# Patient Record
Sex: FEMALE | Race: WHITE | HISPANIC OR LATINO | Employment: FULL TIME | URBAN - METROPOLITAN AREA
[De-identification: names, ages, dates, MRNs, and addresses within clinical notes are randomized per-mention and may not be internally consistent; named-entity substitution may affect disease eponyms.]

---

## 2017-02-09 ENCOUNTER — HOSPITAL ENCOUNTER (OUTPATIENT)
Dept: LAB | Facility: MEDICAL CENTER | Age: 32
End: 2017-02-09
Attending: INTERNAL MEDICINE
Payer: COMMERCIAL

## 2017-02-09 LAB
25(OH)D3 SERPL-MCNC: 14 NG/ML (ref 30–100)
ALBUMIN SERPL BCP-MCNC: 4 G/DL (ref 3.2–4.9)
ALBUMIN/GLOB SERPL: 1.3 G/DL
ALP SERPL-CCNC: 69 U/L (ref 30–99)
ALT SERPL-CCNC: 15 U/L (ref 2–50)
ANION GAP SERPL CALC-SCNC: 8 MMOL/L (ref 0–11.9)
APPEARANCE UR: CLEAR
AST SERPL-CCNC: 17 U/L (ref 12–45)
BASOPHILS # BLD AUTO: 0.03 K/UL (ref 0–0.12)
BASOPHILS NFR BLD AUTO: 0.5 % (ref 0–1.8)
BILIRUB SERPL-MCNC: 0.5 MG/DL (ref 0.1–1.5)
BILIRUB UR QL STRIP.AUTO: NEGATIVE
BUN SERPL-MCNC: 9 MG/DL (ref 8–22)
CALCIUM SERPL-MCNC: 9.5 MG/DL (ref 8.5–10.5)
CHLORIDE SERPL-SCNC: 104 MMOL/L (ref 96–112)
CHOLEST SERPL-MCNC: 224 MG/DL (ref 100–199)
CO2 SERPL-SCNC: 24 MMOL/L (ref 20–33)
COLOR UR AUTO: YELLOW
CREAT SERPL-MCNC: 0.65 MG/DL (ref 0.5–1.4)
EOSINOPHIL # BLD: 0.05 K/UL (ref 0–0.51)
EOSINOPHIL NFR BLD AUTO: 0.8 % (ref 0–6.9)
ERYTHROCYTE [DISTWIDTH] IN BLOOD BY AUTOMATED COUNT: 42.1 FL (ref 35.9–50)
EST. AVERAGE GLUCOSE BLD GHB EST-MCNC: 105 MG/DL
GLOBULIN SER CALC-MCNC: 3.2 G/DL (ref 1.9–3.5)
GLUCOSE SERPL-MCNC: 106 MG/DL (ref 65–99)
GLUCOSE UR STRIP.AUTO-MCNC: NEGATIVE MG/DL
HBA1C MFR BLD: 5.3 % (ref 0–5.6)
HCT VFR BLD AUTO: 41 % (ref 37–47)
HDLC SERPL-MCNC: 50 MG/DL
HGB BLD-MCNC: 13.4 G/DL (ref 12–16)
IMM GRANULOCYTES # BLD AUTO: 0.03 K/UL (ref 0–0.11)
IMM GRANULOCYTES NFR BLD AUTO: 0.5 % (ref 0–0.9)
KETONES UR STRIP.AUTO-MCNC: NEGATIVE MG/DL
LDLC SERPL CALC-MCNC: 134 MG/DL
LEUKOCYTE ESTERASE UR QL STRIP.AUTO: NEGATIVE
LYMPHOCYTES # BLD: 1.71 K/UL (ref 1–4.8)
LYMPHOCYTES NFR BLD AUTO: 27.2 % (ref 22–41)
MCH RBC QN AUTO: 29.6 PG (ref 27–33)
MCHC RBC AUTO-ENTMCNC: 32.7 G/DL (ref 33.6–35)
MCV RBC AUTO: 90.7 FL (ref 81.4–97.8)
MICRO URNS: NORMAL
MONOCYTES # BLD: 0.53 K/UL (ref 0–0.85)
MONOCYTES NFR BLD AUTO: 8.4 % (ref 0–13.4)
NEUTROPHILS # BLD: 3.94 K/UL (ref 2–7.15)
NEUTROPHILS NFR BLD AUTO: 62.6 % (ref 44–72)
NITRITE UR QL STRIP.AUTO: NEGATIVE
NRBC # BLD AUTO: 0 K/UL
NRBC BLD-RTO: 0 /100 WBC
PH UR: 7 [PH]
PLATELET # BLD AUTO: 201 K/UL (ref 164–446)
PMV BLD AUTO: 13.5 FL (ref 9–12.9)
POTASSIUM SERPL-SCNC: 3.9 MMOL/L (ref 3.6–5.5)
PROT SERPL-MCNC: 7.2 G/DL (ref 6–8.2)
PROT UR QL STRIP: NEGATIVE MG/DL
RBC # BLD AUTO: 4.52 M/UL (ref 4.2–5.4)
RBC UR QL AUTO: NEGATIVE
SODIUM SERPL-SCNC: 136 MMOL/L (ref 135–145)
SP GR UR STRIP.AUTO: 1.02
TRIGL SERPL-MCNC: 202 MG/DL (ref 0–149)
WBC # BLD AUTO: 6.3 K/UL (ref 4.8–10.8)

## 2017-02-09 PROCEDURE — 85025 COMPLETE CBC W/AUTO DIFF WBC: CPT

## 2017-02-09 PROCEDURE — 81003 URINALYSIS AUTO W/O SCOPE: CPT

## 2017-02-09 PROCEDURE — 36415 COLL VENOUS BLD VENIPUNCTURE: CPT

## 2017-02-09 PROCEDURE — 80053 COMPREHEN METABOLIC PANEL: CPT

## 2017-02-09 PROCEDURE — 82306 VITAMIN D 25 HYDROXY: CPT

## 2017-02-09 PROCEDURE — 83036 HEMOGLOBIN GLYCOSYLATED A1C: CPT

## 2017-02-09 PROCEDURE — 80061 LIPID PANEL: CPT

## 2017-02-13 ENCOUNTER — HOSPITAL ENCOUNTER (OUTPATIENT)
Dept: LAB | Facility: MEDICAL CENTER | Age: 32
End: 2017-02-13
Attending: OBSTETRICS & GYNECOLOGY
Payer: COMMERCIAL

## 2017-02-13 LAB — B-HCG SERPL-ACNC: <0.6 MIU/ML (ref 0–5)

## 2017-02-13 PROCEDURE — 36415 COLL VENOUS BLD VENIPUNCTURE: CPT

## 2017-02-13 PROCEDURE — 84702 CHORIONIC GONADOTROPIN TEST: CPT

## 2017-03-06 ENCOUNTER — NON-PROVIDER VISIT (OUTPATIENT)
Dept: OCCUPATIONAL MEDICINE | Facility: CLINIC | Age: 32
End: 2017-03-06

## 2017-03-06 DIAGNOSIS — Z02.89 ENCOUNTER FOR OCCUPATIONAL HEALTH ASSESSMENT: ICD-10-CM

## 2017-03-06 PROCEDURE — 90746 HEPB VACCINE 3 DOSE ADULT IM: CPT | Performed by: PREVENTIVE MEDICINE

## 2017-03-06 PROCEDURE — 86580 TB INTRADERMAL TEST: CPT | Performed by: PREVENTIVE MEDICINE

## 2017-03-08 ENCOUNTER — NON-PROVIDER VISIT (OUTPATIENT)
Dept: OCCUPATIONAL MEDICINE | Facility: CLINIC | Age: 32
End: 2017-03-08

## 2017-03-08 DIAGNOSIS — Z11.1 ENCOUNTER FOR PPD SKIN TEST READING: ICD-10-CM

## 2017-03-08 LAB — TB WHEAL 3D P 5 TU DIAM: NORMAL MM

## 2017-03-13 ENCOUNTER — NON-PROVIDER VISIT (OUTPATIENT)
Dept: OCCUPATIONAL MEDICINE | Facility: CLINIC | Age: 32
End: 2017-03-13

## 2017-03-13 DIAGNOSIS — Z11.1 ENCOUNTER FOR PPD TEST: ICD-10-CM

## 2017-03-13 PROCEDURE — 86580 TB INTRADERMAL TEST: CPT | Performed by: PREVENTIVE MEDICINE

## 2017-03-15 ENCOUNTER — NON-PROVIDER VISIT (OUTPATIENT)
Dept: OCCUPATIONAL MEDICINE | Facility: CLINIC | Age: 32
End: 2017-03-15

## 2017-03-15 DIAGNOSIS — Z11.1 ENCOUNTER FOR PPD SKIN TEST READING: ICD-10-CM

## 2017-03-15 LAB — TB WHEAL 3D P 5 TU DIAM: NORMAL MM

## 2017-04-07 ENCOUNTER — EH NON-PROVIDER (OUTPATIENT)
Dept: OCCUPATIONAL MEDICINE | Facility: CLINIC | Age: 32
End: 2017-04-07

## 2017-04-07 DIAGNOSIS — Z23 NEED FOR HEPATITIS B VACCINATION: ICD-10-CM

## 2017-04-07 PROCEDURE — 90746 HEPB VACCINE 3 DOSE ADULT IM: CPT | Performed by: PREVENTIVE MEDICINE

## 2017-09-15 ENCOUNTER — EH NON-PROVIDER (OUTPATIENT)
Dept: OCCUPATIONAL MEDICINE | Facility: CLINIC | Age: 32
End: 2017-09-15

## 2017-09-15 DIAGNOSIS — Z23 NEED FOR VACCINATION: Primary | ICD-10-CM

## 2017-09-15 PROCEDURE — 90746 HEPB VACCINE 3 DOSE ADULT IM: CPT | Performed by: PREVENTIVE MEDICINE

## 2018-01-31 ENCOUNTER — HOSPITAL ENCOUNTER (OUTPATIENT)
Facility: MEDICAL CENTER | Age: 33
End: 2018-01-31
Attending: OBSTETRICS & GYNECOLOGY
Payer: COMMERCIAL

## 2018-01-31 LAB
25(OH)D3 SERPL-MCNC: 16 NG/ML (ref 30–100)
ALBUMIN SERPL BCP-MCNC: 4.8 G/DL (ref 3.2–4.9)
ALBUMIN/GLOB SERPL: 1.9 G/DL
ALP SERPL-CCNC: 64 U/L (ref 30–99)
ALT SERPL-CCNC: 11 U/L (ref 2–50)
ANION GAP SERPL CALC-SCNC: 6 MMOL/L (ref 0–11.9)
AST SERPL-CCNC: 18 U/L (ref 12–45)
BASOPHILS # BLD AUTO: 0.4 % (ref 0–1.8)
BASOPHILS # BLD: 0.03 K/UL (ref 0–0.12)
BILIRUB SERPL-MCNC: 1.1 MG/DL (ref 0.1–1.5)
BUN SERPL-MCNC: 10 MG/DL (ref 8–22)
CALCIUM SERPL-MCNC: 9.1 MG/DL (ref 8.5–10.5)
CHLORIDE SERPL-SCNC: 105 MMOL/L (ref 96–112)
CHOLEST SERPL-MCNC: 217 MG/DL (ref 100–199)
CO2 SERPL-SCNC: 26 MMOL/L (ref 20–33)
CREAT SERPL-MCNC: 0.62 MG/DL (ref 0.5–1.4)
EOSINOPHIL # BLD AUTO: 0.04 K/UL (ref 0–0.51)
EOSINOPHIL NFR BLD: 0.5 % (ref 0–6.9)
ERYTHROCYTE [DISTWIDTH] IN BLOOD BY AUTOMATED COUNT: 42.8 FL (ref 35.9–50)
GLOBULIN SER CALC-MCNC: 2.5 G/DL (ref 1.9–3.5)
GLUCOSE SERPL-MCNC: 99 MG/DL (ref 65–99)
HCT VFR BLD AUTO: 40.3 % (ref 37–47)
HDLC SERPL-MCNC: 50 MG/DL
HGB BLD-MCNC: 13.9 G/DL (ref 12–16)
HIV 1+2 AB+HIV1 P24 AG SERPL QL IA: NON REACTIVE
IMM GRANULOCYTES # BLD AUTO: 0.04 K/UL (ref 0–0.11)
IMM GRANULOCYTES NFR BLD AUTO: 0.5 % (ref 0–0.9)
LDLC SERPL CALC-MCNC: 144 MG/DL
LYMPHOCYTES # BLD AUTO: 1.84 K/UL (ref 1–4.8)
LYMPHOCYTES NFR BLD: 24.2 % (ref 22–41)
MCH RBC QN AUTO: 31.7 PG (ref 27–33)
MCHC RBC AUTO-ENTMCNC: 34.5 G/DL (ref 33.6–35)
MCV RBC AUTO: 92 FL (ref 81.4–97.8)
MONOCYTES # BLD AUTO: 0.4 K/UL (ref 0–0.85)
MONOCYTES NFR BLD AUTO: 5.3 % (ref 0–13.4)
NEUTROPHILS # BLD AUTO: 5.24 K/UL (ref 2–7.15)
NEUTROPHILS NFR BLD: 69.1 % (ref 44–72)
NRBC # BLD AUTO: 0 K/UL
NRBC BLD-RTO: 0 /100 WBC
PLATELET # BLD AUTO: 230 K/UL (ref 164–446)
PMV BLD AUTO: 13.2 FL (ref 9–12.9)
POTASSIUM SERPL-SCNC: 4 MMOL/L (ref 3.6–5.5)
PROT SERPL-MCNC: 7.3 G/DL (ref 6–8.2)
RBC # BLD AUTO: 4.38 M/UL (ref 4.2–5.4)
SODIUM SERPL-SCNC: 137 MMOL/L (ref 135–145)
TRIGL SERPL-MCNC: 116 MG/DL (ref 0–149)
TSH SERPL DL<=0.005 MIU/L-ACNC: 2.9 UIU/ML (ref 0.38–5.33)
WBC # BLD AUTO: 7.6 K/UL (ref 4.8–10.8)

## 2018-01-31 PROCEDURE — 82306 VITAMIN D 25 HYDROXY: CPT

## 2018-01-31 PROCEDURE — 85025 COMPLETE CBC W/AUTO DIFF WBC: CPT

## 2018-01-31 PROCEDURE — 84443 ASSAY THYROID STIM HORMONE: CPT

## 2018-01-31 PROCEDURE — 80061 LIPID PANEL: CPT

## 2018-01-31 PROCEDURE — 87389 HIV-1 AG W/HIV-1&-2 AB AG IA: CPT

## 2018-01-31 PROCEDURE — 80053 COMPREHEN METABOLIC PANEL: CPT

## 2018-05-25 ENCOUNTER — OFFICE VISIT (OUTPATIENT)
Dept: MEDICAL GROUP | Facility: PHYSICIAN GROUP | Age: 33
End: 2018-05-25
Payer: COMMERCIAL

## 2018-05-25 VITALS
SYSTOLIC BLOOD PRESSURE: 106 MMHG | HEIGHT: 61 IN | OXYGEN SATURATION: 96 % | RESPIRATION RATE: 16 BRPM | BODY MASS INDEX: 29.45 KG/M2 | TEMPERATURE: 69.8 F | DIASTOLIC BLOOD PRESSURE: 60 MMHG | HEART RATE: 74 BPM | WEIGHT: 156 LBS

## 2018-05-25 DIAGNOSIS — R53.82 CHRONIC FATIGUE: ICD-10-CM

## 2018-05-25 DIAGNOSIS — G43.109 MIGRAINE WITH AURA AND WITHOUT STATUS MIGRAINOSUS, NOT INTRACTABLE: ICD-10-CM

## 2018-05-25 DIAGNOSIS — M19.032 PRIMARY OSTEOARTHRITIS OF BOTH WRISTS: ICD-10-CM

## 2018-05-25 DIAGNOSIS — M19.031 PRIMARY OSTEOARTHRITIS OF BOTH WRISTS: ICD-10-CM

## 2018-05-25 DIAGNOSIS — E78.2 MIXED HYPERLIPIDEMIA: ICD-10-CM

## 2018-05-25 DIAGNOSIS — E55.9 VITAMIN D DEFICIENCY: ICD-10-CM

## 2018-05-25 PROCEDURE — 99214 OFFICE O/P EST MOD 30 MIN: CPT | Performed by: NURSE PRACTITIONER

## 2018-05-25 RX ORDER — ERGOCALCIFEROL 1.25 MG/1
50000 CAPSULE ORAL
COMMUNITY
Start: 2018-03-12 | End: 2018-06-19

## 2018-05-25 RX ORDER — SUMATRIPTAN 25 MG/1
25-100 TABLET, FILM COATED ORAL
Qty: 10 TAB | Refills: 3 | Status: SHIPPED | OUTPATIENT
Start: 2018-05-25 | End: 2018-11-22

## 2018-05-25 ASSESSMENT — PAIN SCALES - GENERAL: PAINLEVEL: NO PAIN

## 2018-05-25 ASSESSMENT — PATIENT HEALTH QUESTIONNAIRE - PHQ9: CLINICAL INTERPRETATION OF PHQ2 SCORE: 0

## 2018-05-25 NOTE — PROGRESS NOTES
"Chief Complaint   Patient presents with   • Establish Care     New Patient    • Labs Only   • Medication Refill       HISTORY OF THE PRESENT ILLNESS: This is a 32 y.o. female new patient to our practice. This pleasant patient is here today establish and discuss migraines.    Chronic fatigue  Chronic in nature. States she drinks a lot of coffee. Does have low vitamin D. States that she struggles with sleep related to back pain. States she tosses and turns, states she wakes up because she worries about her elder dogs passing. Patient does have some anxiety, some possible seasonal affective disorder patient reports that she suffered to participate in her regular activities but states this gets better over the summer. Patient is also having some \"gallbladder issues\".    Migraine with aura and without status migrainosus, not intractable  Chronic in nature. Started 5-6 years ago. States throbbing behind her eye, states light sensitivity. Severe blurry vision and nausea. States they happen approximately 1/month. States no change in HA pattern. States she will sleep in a dark. States it will extend into her neck, states that she uses home remedies. Stress triggers her migraines.    Mixed hyperlipidemia  Chronic in nature. Stable. Most recent labs in January.    Primary osteoarthritis of both wrists  Chronic in nature. Stable. Managed with Naprosyn.      Past Medical History:   Diagnosis Date   • Hyperlipidemia    • Osteoarthritis    • Vitamin D deficiency disease        History reviewed. No pertinent surgical history.    Family Status   Relation Status   • Mother    • Sister      Family History   Problem Relation Age of Onset   • Psychiatry Mother      depression   • Psychiatry Sister      depression       Social History   Substance Use Topics   • Smoking status: Never Smoker   • Smokeless tobacco: Never Used   • Alcohol use Yes      Comment: once a month        Allergies: Latex    Current Outpatient Prescriptions Ordered " "in Epic   Medication Sig Dispense Refill   • SUMAtriptan (IMITREX) 25 MG Tab tablet Take 1-4 Tabs by mouth Once PRN for Migraine for up to 1 dose. 10 Tab 3   • vitamin D, Ergocalciferol, (DRISDOL) 18030 units Cap capsule Take 50,000 Units by mouth every 7 days.       No current [facility]-ordered facility-administered medications on file.        Review of Systems   Constitutional:  Negative for fever, chills, weight loss and malaise/fatigue.   HENT:  Negative for ear pain, nosebleeds, congestion, sore throat and neck pain.    Eyes:  Negative for blurred vision.   Respiratory:  Negative for cough, sputum production, shortness of breath and wheezing.    Cardiovascular:  Negative for chest pain, palpitations, orthopnea and leg swelling.   Gastrointestinal:  Negative for heartburn, nausea, vomiting and abdominal pain.   Genitourinary:  Negative for dysuria, urgency and frequency.   Musculoskeletal:  Negative for myalgias, back pain and joint pain.   Skin:  Negative for rash and itching.   Neurological:  Negative for dizziness, tingling, tremors, sensory change, focal weakness and headaches.   Endo/Heme/Allergies:  Does not bruise/bleed easily.   Psychiatric/Behavioral:  Negative for depression, positive anxiety, or negative memory loss.     All other systems reviewed and are negative except as in HPI.    Exam: Blood pressure 106/60, pulse 74, temperature (!) 21 °C (69.8 °F), resp. rate 16, height 1.549 m (5' 1\"), weight 70.8 kg (156 lb), last menstrual period 05/09/2018, SpO2 96 %, not currently breastfeeding.  General:  Normal appearing. No distress.  Pulmonary:  Clear to ausculation.  Normal effort. No rales, ronchi, or wheezing.  Cardiovascular:  Regular rate and rhythm without murmur. Carotid and radial pulses are intact and equal bilaterally.  Neurologic:  Grossly nonfocal  Skin:  Warm and dry.  No obvious lesions.  Musculoskeletal:  Normal gait. No extremity cyanosis, clubbing, or edema.  Psych:  Normal mood and affect. " Alert and oriented x3. Judgment and insight is normal.    PLAN:    1. Primary osteoarthritis of both wrists  Continue current plan of care    2. Mixed hyperlipidemia  Continue current management, update labs    3. Chronic fatigue  Plan to check vitamin D and B12 concern for seasonal affective disorder versus depression versus anxiety versus vitamin deficiency  - VITAMIN D,25 HYDROXY; Future  - VITAMIN B12; Future    4. Migraine with aura and without status migrainosus, not intractable  Continue current plan of care counseled regarding risks, benefits, side effects of medication.  - SUMAtriptan (IMITREX) 25 MG Tab tablet; Take 1-4 Tabs by mouth Once PRN for Migraine for up to 1 dose.  Dispense: 10 Tab; Refill: 3    5. Vitamin D deficiency  Repeat lab, continue supplementation.  - VITAMIN D,25 HYDROXY; Future    Follow-up in one month LONG. Patient is encouraged to be seen in the emergency room for chest pain, palpitations, shortness of breath, dizziness, severe abdominal pain or other concerning symptoms.    Please note that this dictation was created using voice recognition software. I have made every reasonable attempt to correct obvious errors, but I expect that there are errors of grammar and possibly content that I did not discover before finalizing the note.      Assessment/Plan  1. Primary osteoarthritis of both wrists     2. Mixed hyperlipidemia     3. Chronic fatigue  VITAMIN D,25 HYDROXY    VITAMIN B12   4. Migraine with aura and without status migrainosus, not intractable  SUMAtriptan (IMITREX) 25 MG Tab tablet   5. Vitamin D deficiency  VITAMIN D,25 HYDROXY         I have placed the below orders and discussed them with an approved delegating provider. The MA is performing the below orders under the direction of Dr. Be.

## 2018-05-25 NOTE — LETTER
[facility]  NICHOLAS TamayoP.RANGEL  [address]  [place] [address]  Fax: [phone]   Authorization for Release/Disclosure of   Protected Health Information   Name: EZRA CHAVEZ : 1985 SSN: [id number]   Address: [address] [place] [address] [place] [address] Phone:    [phone] (home)    I authorize the entity listed below to release/disclose the PHI below to:   [facility]/MADELINE Tamayo.P.R.CARMEL and NICHOLAS TamayoP.RANGEL   Provider or Entity Name:  [facility]    Address   City, [facility]  Phone:      Fax:     Reason for request: continuity of care   Information to be released:    [  ] LAST COLONOSCOPY,  including any PATH REPORT and follow-up  [  ] LAST FIT/COLOGUARD RESULT [  ] LAST DEXA  [  ] LAST MAMMOGRAM  [  ] LAST PAP  [  ] LAST LABS [  ] RETINA EXAM REPORT  [  ] IMMUNIZATION RECORDS  [ x ] Release all info      [  ] Check here and initial the line next to each item to release ALL health information INCLUDING  _____ Care and treatment for drug and / or alcohol abuse  _____ HIV testing, infection status, or AIDS  _____ Genetic Testing    DATES OF SERVICE OR TIME PERIOD TO BE DISCLOSED: _____________  I understand and acknowledge that:  * This Authorization may be revoked at any time by you in writing, except if your health information has already been used or disclosed.  * Your health information that will be used or disclosed as a result of you signing this authorization could be re-disclosed by the recipient. If this occurs, your re-disclosed health information may no longer be protected by State or Federal laws.  * You may refuse to sign this Authorization. Your refusal will not affect your ability to obtain treatment.  * This Authorization becomes effective upon signing and will  on (date) __________.      If no date is indicated, this Authorization will  one (1) year from the signature date.    Name:  Marcial Gao    Signature:   Date:     5/25/2018       PLEASE FAX REQUESTED RECORDS BACK TO: [phone]

## 2018-05-25 NOTE — ASSESSMENT & PLAN NOTE
"Chronic in nature. States she drinks a lot of coffee. Does have low vitamin D. States that she struggles with sleep related to back pain. States she tosses and turns, states she wakes up because she worries about her elder dogs passing. Patient does have some anxiety, some possible seasonal affective disorder patient reports that she suffered to participate in her regular activities but states this gets better over the summer. Patient is also having some \"gallbladder issues\".  "

## 2018-05-25 NOTE — LETTER
[facility]  NICHOLAS TamayoP.RANGEL  [address]  [place] [address]  Fax: [phone]   Authorization for Release/Disclosure of   Protected Health Information   Name: EZRA CHAVEZ : 1985 SSN: [id number]   Address: [address] [place] [address]   [place] [address] Phone:    [phone] (home)    I authorize the entity listed below to release/disclose the PHI below to:   [facility]/MADELINE Tamayo.P.R.CARMEL and MADELINE Tamayo.P.RANGEL   Provider or Entity Name:  CARLIN Vera    Address   City, State, Zip   Phone:      Fax:     Reason for request: continuity of care   Information to be released:    [  ] LAST COLONOSCOPY,  including any PATH REPORT and follow-up  [  ] LAST FIT/COLOGUARD RESULT [  ] LAST DEXA  [  ] LAST MAMMOGRAM  [ x ] LAST PAP  [  ] LAST LABS [  ] RETINA EXAM REPORT  [  ] IMMUNIZATION RECORDS  [  ] Release all info      [  ] Check here and initial the line next to each item to release ALL health information INCLUDING  _____ Care and treatment for drug and / or alcohol abuse  _____ HIV testing, infection status, or AIDS  _____ Genetic Testing    DATES OF SERVICE OR TIME PERIOD TO BE DISCLOSED: _____________  I understand and acknowledge that:  * This Authorization may be revoked at any time by you in writing, except if your health information has already been used or disclosed.  * Your health information that will be used or disclosed as a result of you signing this authorization could be re-disclosed by the recipient. If this occurs, your re-disclosed health information may no longer be protected by State or Federal laws.  * You may refuse to sign this Authorization. Your refusal will not affect your ability to obtain treatment.  * This Authorization becomes effective upon signing and will  on (date) __________.      If no date is indicated, this Authorization will  one (1) year from the signature date.    Name: Ezra  Tang Gao    Signature:   Date:     5/25/2018       PLEASE FAX REQUESTED RECORDS BACK TO: [phone]

## 2018-05-25 NOTE — ASSESSMENT & PLAN NOTE
Chronic in nature. Started 5-6 years ago. States throbbing behind her eye, states light sensitivity. Severe blurry vision and nausea. States they happen approximately 1/month. States no change in HA pattern. States she will sleep in a dark. States it will extend into her neck, states that she uses home remedies. Stress triggers her migraines.

## 2018-05-29 ENCOUNTER — HOSPITAL ENCOUNTER (OUTPATIENT)
Dept: RADIOLOGY | Facility: MEDICAL CENTER | Age: 33
End: 2018-05-29
Attending: SURGERY
Payer: COMMERCIAL

## 2018-05-29 DIAGNOSIS — R10.30 ABDOMINAL PAIN, LOWER: ICD-10-CM

## 2018-05-29 PROCEDURE — 76700 US EXAM ABDOM COMPLETE: CPT

## 2018-06-05 ENCOUNTER — APPOINTMENT (OUTPATIENT)
Dept: RADIOLOGY | Facility: MEDICAL CENTER | Age: 33
End: 2018-06-05
Attending: SURGERY
Payer: COMMERCIAL

## 2018-06-15 ENCOUNTER — HOSPITAL ENCOUNTER (OUTPATIENT)
Dept: LAB | Facility: MEDICAL CENTER | Age: 33
End: 2018-06-15
Attending: NURSE PRACTITIONER
Payer: COMMERCIAL

## 2018-06-15 ENCOUNTER — HOSPITAL ENCOUNTER (OUTPATIENT)
Dept: RADIOLOGY | Facility: MEDICAL CENTER | Age: 33
End: 2018-06-15
Attending: SURGERY
Payer: COMMERCIAL

## 2018-06-15 DIAGNOSIS — K82.8 BILIARY DYSKINESIA: ICD-10-CM

## 2018-06-15 DIAGNOSIS — E55.9 VITAMIN D DEFICIENCY: ICD-10-CM

## 2018-06-15 DIAGNOSIS — R53.82 CHRONIC FATIGUE: ICD-10-CM

## 2018-06-15 DIAGNOSIS — E78.2 MIXED HYPERLIPIDEMIA: ICD-10-CM

## 2018-06-15 LAB
25(OH)D3 SERPL-MCNC: 41 NG/ML (ref 30–100)
CHOLEST SERPL-MCNC: 243 MG/DL (ref 100–199)
HDLC SERPL-MCNC: 52 MG/DL
LDLC SERPL CALC-MCNC: 172 MG/DL
TRIGL SERPL-MCNC: 94 MG/DL (ref 0–149)
VIT B12 SERPL-MCNC: 622 PG/ML (ref 211–911)

## 2018-06-15 PROCEDURE — 82306 VITAMIN D 25 HYDROXY: CPT

## 2018-06-15 PROCEDURE — 700111 HCHG RX REV CODE 636 W/ 250 OVERRIDE (IP)

## 2018-06-15 PROCEDURE — 82607 VITAMIN B-12: CPT

## 2018-06-15 PROCEDURE — 36415 COLL VENOUS BLD VENIPUNCTURE: CPT

## 2018-06-15 PROCEDURE — 80061 LIPID PANEL: CPT

## 2018-06-15 PROCEDURE — 78227 HEPATOBIL SYST IMAGE W/DRUG: CPT

## 2018-06-15 RX ORDER — SINCALIDE 5 UG/5ML
INJECTION, POWDER, LYOPHILIZED, FOR SOLUTION INTRAVENOUS
Status: COMPLETED
Start: 2018-06-15 | End: 2018-06-15

## 2018-06-15 RX ADMIN — SINCALIDE 1.4 MCG: 5 INJECTION, POWDER, LYOPHILIZED, FOR SOLUTION INTRAVENOUS at 08:45

## 2018-06-18 ENCOUNTER — PATIENT MESSAGE (OUTPATIENT)
Dept: MEDICAL GROUP | Facility: PHYSICIAN GROUP | Age: 33
End: 2018-06-18

## 2018-06-18 DIAGNOSIS — E55.9 VITAMIN D DEFICIENCY: ICD-10-CM

## 2018-06-19 RX ORDER — ERGOCALCIFEROL 1.25 MG/1
50000 CAPSULE ORAL
Qty: 12 CAP | Refills: 3 | Status: SHIPPED | OUTPATIENT
Start: 2018-06-19 | End: 2018-11-22

## 2018-06-22 ENCOUNTER — APPOINTMENT (OUTPATIENT)
Dept: MEDICAL GROUP | Facility: PHYSICIAN GROUP | Age: 33
End: 2018-06-22
Payer: COMMERCIAL

## 2018-09-12 ENCOUNTER — OFFICE VISIT (OUTPATIENT)
Dept: MEDICAL GROUP | Facility: PHYSICIAN GROUP | Age: 33
End: 2018-09-12
Payer: COMMERCIAL

## 2018-09-12 VITALS
HEART RATE: 100 BPM | BODY MASS INDEX: 28.32 KG/M2 | HEIGHT: 61 IN | TEMPERATURE: 98.6 F | DIASTOLIC BLOOD PRESSURE: 80 MMHG | WEIGHT: 150 LBS | SYSTOLIC BLOOD PRESSURE: 118 MMHG | RESPIRATION RATE: 16 BRPM | OXYGEN SATURATION: 96 %

## 2018-09-12 DIAGNOSIS — J32.9 SINUSITIS, UNSPECIFIED CHRONICITY, UNSPECIFIED LOCATION: ICD-10-CM

## 2018-09-12 DIAGNOSIS — R05.9 COUGH: ICD-10-CM

## 2018-09-12 PROCEDURE — 99214 OFFICE O/P EST MOD 30 MIN: CPT | Performed by: NURSE PRACTITIONER

## 2018-09-12 RX ORDER — PROMETHAZINE HYDROCHLORIDE 6.25 MG/5ML
6.25 SYRUP ORAL 4 TIMES DAILY PRN
Qty: 120 ML | Refills: 0 | Status: SHIPPED | OUTPATIENT
Start: 2018-09-12 | End: 2018-11-22

## 2018-09-12 RX ORDER — PROMETHAZINE HYDROCHLORIDE 6.25 MG/5ML
6.25 SYRUP ORAL 4 TIMES DAILY PRN
Qty: 120 ML | Refills: 0 | Status: SHIPPED | OUTPATIENT
Start: 2018-09-12 | End: 2018-09-12 | Stop reason: SDUPTHER

## 2018-09-12 RX ORDER — BENZONATATE 100 MG/1
100 CAPSULE ORAL 3 TIMES DAILY PRN
Qty: 60 CAP | Refills: 0 | Status: SHIPPED | OUTPATIENT
Start: 2018-09-12 | End: 2018-09-12 | Stop reason: SDUPTHER

## 2018-09-12 RX ORDER — BENZONATATE 100 MG/1
100 CAPSULE ORAL 3 TIMES DAILY PRN
Qty: 60 CAP | Refills: 0 | Status: SHIPPED | OUTPATIENT
Start: 2018-09-12 | End: 2018-11-22

## 2018-09-12 NOTE — LETTER
September 12, 2018        To whom it may concern,      Nellynaomie Tang Gao was seen in my office and is under my care.  Please excuse her from work through Sept 17.  She may return to work on the 18th.          Thank you,          Gurjit Valdez, APRN-BC

## 2018-09-12 NOTE — PROGRESS NOTES
Chief Complaint   Patient presents with   • Cough       HISTORY OF PRESENT ILLNESS: Patient is a 33 y.o. female established patient of [facility] who presents today to discuss the following issues:    Sinusitis  Started about 1 week ago with a sore throat.  Has progressed to sinus pressure and congestion, green drainage, and cough.  Used OTC meds and rested.  Cough is still bad at night.  Started a zpak yesterday.  Discussed plan.  She would like to proceed with trials of tessalon perles and phenergan cough syrup without codeine.      Patient Active Problem List    Diagnosis Date Noted   • Sinusitis 09/12/2018   • Primary osteoarthritis of both wrists 05/25/2018   • Mixed hyperlipidemia 05/25/2018   • Chronic fatigue 05/25/2018   • Migraine with aura and without status migrainosus, not intractable 05/25/2018       Allergies:Latex    Current Outpatient Prescriptions   Medication Sig Dispense Refill   • benzonatate (TESSALON) 100 MG Cap Take 1 Cap by mouth 3 times a day as needed for Cough. 60 Cap 0   • promethazine (PHENERGAN) 6.25 MG/5ML Syrup Take 5 mL by mouth 4 times a day as needed for Nausea/Vomiting. 120 mL 0   • ergocalciferol (DRISDOL) 09087 UNIT capsule Take 1 Cap by mouth every 7 days. 12 Cap 3   • SUMAtriptan (IMITREX) 25 MG Tab tablet Take 1-4 Tabs by mouth Once PRN for Migraine for up to 1 dose. 10 Tab 3     No current facility-administered medications for this visit.        Social History   Substance Use Topics   • Smoking status: Never Smoker   • Smokeless tobacco: Never Used   • Alcohol use Yes      Comment: once a month        Family Status   Relation Status   • Mo (Not Specified)   • Sis (Not Specified)     Family History   Problem Relation Age of Onset   • Psychiatry Mother         depression   • Psychiatry Sister         depression       Review of Systems:   Constitutional: Negative for fever, chills, weight loss and malaise/fatigue.   HENT: Negative for ear pain, nosebleeds, sore throat and  "neck pain.  Positive for sinus pressure and congestion.  Eyes: Negative for blurred vision.   Respiratory: Negative for cough, sputum production, shortness of breath and wheezing.    Cardiovascular: Negative for chest pain, palpitations, orthopnea and leg swelling.   Gastrointestinal: Negative for heartburn, nausea, vomiting and abdominal pain.   Genitourinary: Negative for dysuria, urgency and frequency.   Musculoskeletal: Negative for myalgias, joint pain, and back pain.  Skin: Negative for rash and itching.   Neurological: Negative for dizziness, tingling, tremors, sensory change, focal weakness and headaches.   Endo/Heme/Allergies: Does not bruise/bleed easily.   Psychiatric/Behavioral: Negative for depression, suicidal ideas and memory loss.  The patient is not nervous/anxious and does not have insomnia.    All other systems reviewed and are negative except as in HPI.    Exam:  Blood pressure 118/80, pulse 100, temperature 37 °C (98.6 °F), resp. rate 16, height 1.549 m (5' 1\"), weight 68 kg (150 lb), SpO2 96 %.  General:  Well nourished, well developed female in NAD  Head: Grossly normal. Turbs red and swollen.  Neck: Supple without JVD or bruit. Thyroid is not enlarged.  Pulmonary: Clear to ausculation. Normal effort. No rales, ronchi, or wheezing.  Cardiovascular: Regular rate and rhythm without murmur.   Abdomen:  Soft, nontender, nondistended.  Extremities: No clubbing, cyanosis, or edema.  Skin: Intact with no obvious rashes or lesions.  Neuro: Grossly intact.  Psych: Alert and oriented x 3.  Mood and affect appropriate.    Medical decision-making and discussion: Marcial is here today to discuss a sinus infection.  She will rest, finish her Zpak, and continue with OTC meds.  Prescriptions for tessalon perles and liquid phenergan were sent to her pharmacy.  She will follow-up here as needed.          Assessment/Plan:  1. Sinusitis, unspecified chronicity, unspecified location     2. Cough  benzonatate " (TESSALON) 100 MG Cap    promethazine (PHENERGAN) 6.25 MG/5ML Syrup    DISCONTINUED: benzonatate (TESSALON) 100 MG Cap    DISCONTINUED: promethazine (PHENERGAN) 6.25 MG/5ML Syrup       Return if symptoms worsen or fail to improve.    Please note that this dictation was created using voice recognition software. I have made every reasonable attempt to correct obvious errors, but I expect that there are errors of grammar and possibly content that I did not discover before finalizing the note.

## 2018-11-21 ENCOUNTER — OCCUPATIONAL MEDICINE (OUTPATIENT)
Dept: URGENT CARE | Facility: PHYSICIAN GROUP | Age: 33
End: 2018-11-21
Payer: COMMERCIAL

## 2018-11-21 VITALS
BODY MASS INDEX: 29.29 KG/M2 | HEART RATE: 89 BPM | TEMPERATURE: 97.9 F | OXYGEN SATURATION: 97 % | DIASTOLIC BLOOD PRESSURE: 82 MMHG | RESPIRATION RATE: 16 BRPM | WEIGHT: 155 LBS | SYSTOLIC BLOOD PRESSURE: 110 MMHG

## 2018-11-21 DIAGNOSIS — S39.012A STRAIN OF LUMBAR REGION, INITIAL ENCOUNTER: ICD-10-CM

## 2018-11-21 DIAGNOSIS — S46.811A TRAPEZIUS STRAIN, RIGHT, INITIAL ENCOUNTER: ICD-10-CM

## 2018-11-21 DIAGNOSIS — S39.012A LOW BACK STRAIN, INITIAL ENCOUNTER: ICD-10-CM

## 2018-11-21 PROCEDURE — 99214 OFFICE O/P EST MOD 30 MIN: CPT | Performed by: FAMILY MEDICINE

## 2018-11-21 RX ORDER — IBUPROFEN 200 MG
200 TABLET ORAL EVERY 6 HOURS PRN
COMMUNITY
End: 2018-11-26

## 2018-11-21 NOTE — LETTER
[address] [address]  Phone:  [phone] - Fax:  [phone]   [facility] Progress Report and Disability Certification  Date of Service: 11/21/2018   No Show:  No  Date / Time of Next Visit: 11/25/2018   Claim Information   Patient Name: Marcial Gao  Claim Number:     Employer: [employer]  Date of Injury: 11/20/2018     Insurer / TPA: [employer]  ID / SSN:     Occupation: Medical Assistant  Diagnosis: Diagnoses of Low back strain, initial encounter, Strain of lumbar region, initial encounter, and Trapezius strain, right, initial encounter were pertinent to this visit.    Medical Information   Related to Industrial Injury? Yes    Subjective Complaints:  Date of injury: 11/20/2018  Patient is here for evaluation of a back injury that happened yesterday while working.  She works with a surgeon.  Yesterday she she was trying to help a patient be transferred to stretcher and the weight of the patient fell and fell on her.  She did not have any immediate pain.  Later on in the evening she noticed increasing back stiffness.  Currently she is complaining of soreness on the right side of the shoulder posteriorly also and across the mid back and also in the lower back but denies any radiculopathy in the upper or lower extremity including muscle weakness, or loss of bladder or bowel control.  She denies any prior back problems.  She went to work today but then decided to come here for work related injury evaluation.  She reports that she took two 800 mg of ibuprofen this morning and two 800 mg of ibuprofen this afterno  on.  She has a weak stomach.  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   Objective Findings: Physical Exam   Constitutional: She is oriented to person, place, and time. She appears well-developed and well-nourished. No  distress.   HENT:   Head: Normocephalic and atraumatic.   Neck: Normal range of motion. Muscular tenderness present. No spinous process tenderness present.   Cardiovascular: Normal rate.    Pulmonary/Chest: Effort normal. No respiratory distress.   Musculoskeletal:        Right shoulder: Normal.        Left shoulder: Normal.        Right elbow: Normal.       Left elbow: Normal.        Left wrist: Normal.        Cervical back: She exhibits tenderness (On the right paraspinal region by the shoulders). She exhibits normal range of motion, no bony tenderness, no swelling, no edema, no deformity, no laceration and no pain.        Thoracic back: She exhibits tenderness, pain and spasm. She exhibits normal range of motion, no bony tenderness, no swelling, no edema, no deformity and no laceration.        Lumbar back: She exhibits tenderness, pain and spasm. She exhibits normal range of motion, no bony tenderness, no swelling, no edema, no deformity and no laceration.        Back:    Neurological: She is oriented to person, place, and time. She has normal strength. She displays normal reflexes. No sensory deficit.   Negative straight leg raising bilaterally   Skin: Skin is warm. She is not diaphoretic. No pallor.      Pre-Existing Condition(s):     Assessment:   Initial Visit    Status: Additional Care Required  Permanent Disability:No    Plan: Medication  Comments:You may use cyclobenzaprine 10 mg at night as needed.  Please avoid any further ibuprofen or Aleve    Diagnostics:      Comments:       Disability Information   Status: Temporarily Totally Disabled    From:  11/21/2018  Through: 11/25/2018 Restrictions are:     Physical Restrictions   Sitting:    Standing:    Stooping:    Bending:      Squatting:  < or = to 4 hrs/day Walking:    Climbing:    Pushing:      Pulling:    Other:    Reaching Above Shoulder (L):   Reaching Above Shoulder (R):       Reaching Below Shoulder (L):    Reaching Below Shoulder (R):      Not to  exceed Weight Limits   Carrying(hrs):   Weight Limit(lb):   Lifting(hrs):   Weight  Limit(lb):     Comments: Recommend resting, icing or heat, may use 1 tablet of cyclobenzaprine 10 mg at night as needed.  Avoid ibuprofen, Aleve today.  Follow-up tomorrow for recheck, at which point we can entertain Toradol intramuscular injection. Recommend a follow up on Sunday also in urgent care    Repetitive Actions   Hands: i.e. Fine Manipulations from Grasping:     Feet: i.e. Operating Foot Controls:     Driving / Operate Machinery:     Physician Name: Hank Howard M.D. Physician Signature:   e-Signature: Dr. Desmond Hill, Medical Director   Clinic Name / Location: [address] [address] Clinic Phone Number: Dept: [phone]   Appointment Time: 5:50 Pm Visit Start Time: 6:12 PM   Check-In Time:  6:01 Pm Visit Discharge Time:  7:11PM   Original-Treating Physician or Chiropractor    Page 2-Insurer/TPA    Page 3-Employer    Page 4-Employee

## 2018-11-21 NOTE — LETTER
"EMPLOYEE’S CLAIM FOR COMPENSATION/ REPORT OF INITIAL TREATMENT  FORM C-4    EMPLOYEE’S CLAIM - PROVIDE ALL INFORMATION REQUESTED   First Name  Marcial Last Name  Murray Birthdate                    1985                Sex  female Claim Number   Home Address  [address] Age  33 y.o. 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 (if applicable)  transfering patient from wheelchair to exam table    How did this injury or occupational disease occur? (Be specific an answer in detail. Use additional sheet if necessary)  I was instructed by Dr Logan to assist lifting and transfering patient from wheelchair to exam table \" maneuver\". Patient wighed 185lb. Dr logan is about 6'3\" and I am 5'1\". I carried more weight huring my lower back,upper back, neck, left arm and wrist   If you believe that you have an occupational disease, when did you first have knowledge of the disability and it relationship to your employment?   Witnesses to the Accident  Dr Logan, Alyssa Delaney, Paula Murillo      Nature " of Injury or Occupational Disease  Strain  Part(s) of Body Injured or Affected  Lower Back Area (Lumbar Area & Lumbo-Sacral), Wrist (L) and Hand (L), Upper Back Area (Thoracic Area)    I certify that the above is true and correct to the best of my knowledge and that I have provided this information in order to obtain the benefits of [place]  I hereby authorize any physician, chiropractor, surgeon, practitioner, or other person, any hospital, including [facility] or [facility], any medical service organization, any insurance company, or other institution or organization to release to each other, any medical or other information, including benefits paid or payable, pertinent to this injury or disease, except information relative to diagnosis, treatment and/or counseling for AIDS, psychological conditions, alcohol or controlled substances, for which I must give specific authorization.  A Photostat of this authorization shall be as valid as the original.     Date   Place   Employee’s Signature   THIS REPORT MUST BE COMPLETED AND MAILED WITHIN 3 WORKING DAYS OF TREATMENT   Place  [facility]  Name of Facility  [facility]   Date  11/21/2018 Diagnosis  (S39.012A) Low back strain, initial encounter  (S39.012A) Strain of lumbar region, initial encounter  (S46.811A) Trapezius strain, right, initial encounter Is there evidence the injured employee was under the influence of alcohol and/or another controlled substance at the time of accident?   Hour  6:12 PM Description of Injury or Disease  Diagnoses of Low back strain, initial encounter, Strain of lumbar region, initial encounter, and Trapezius strain, right, initial encounter were pertinent to this visit.     Treatment     Have you advised the patient to remain off work five days or more?     X-Ray Findings      If Yes   From Date   "To Date      From information given by the employee, together with medical evidence, can you directly connect this injury or occupational disease as job incurred?    If No Full Duty    Modified Duty      Is additional medical care by a physician indicated?    If Modified Duty, Specify any Limitations / Restrictions      Do you know of any previous injury or disease contributing to this condition or occupational disease?                                Date  11/21/2018 Print Doctor’s Name Hank Howard M.D. I certify the employer’s copy of  this form was mailed on:   Address  [address]  [facility] Insurer’s Use Only     [facility]  [address]    Provider’s Tax ID Number  [id number] Telephone  Dept: [phone]            e-Signature: Dr. Desmond Hill, Medical Director Degree  MD        ORIGINAL-TREATING PHYSICIAN OR CHIROPRACTOR    PAGE 2-INSURER/TPA    PAGE 3-EMPLOYER    PAGE 4-EMPLOYEE             Form C-4 (rev10/07)              BRIEF DESCRIPTION OF RIGHTS AND BENEFITS  (Pursuant to [place]RS 616C.050)    Notice of Injury or Occupational Disease (Incident Report Form C-1): If an injury or occupational disease (OD) arises out of and in the  course of employment, you must provide written notice to your employer as soon as practicable, but no later than 7 days after the accident or  OD. Your employer shall maintain a sufficient supply of the required forms.    Claim for Compensation (Form C-4): If medical treatment is sought, the form C-4 is available at the place of initial treatment. A completed  \"Claim for Compensation\" (Form C-4) must be filed within 90 days after an accident or OD. The treating physician or chiropractor must,  within 3 working days after treatment, complete and mail to the employer, the employer's insurer and third-party , the Claim for  Compensation.    Medical Treatment: If you require medical treatment for your on-the-job injury or OD, you may be required to select " a physician or  chiropractor from a list provided by your workers’ compensation insurer, if it has contracted with an Organization for Managed Care (MCO) or  Preferred Provider Organization (PPO) or providers of health care. If your employer has not entered into a contract with an MCO or PPO, you  may select a physician or chiropractor from the Panel of Physicians and Chiropractors. Any medical costs related to your industrial injury or  OD will be paid by your insurer.    Temporary Total Disability (TTD): If your doctor has certified that you are unable to work for a period of at least 5 consecutive days, or 5  cumulative days in a 20-day period, or places restrictions on you that your employer does not accommodate, you may be entitled to TTD  compensation.    Temporary Partial Disability (TPD): If the wage you receive upon reemployment is less than the compensation for TTD to which you are  entitled, the insurer may be required to pay you TPD compensation to make up the difference. TPD can only be paid for a maximum of 24  months.    Permanent Partial Disability (PPD): When your medical condition is stable and there is an indication of a PPD as a result of your injury or  OD, within 30 days, your insurer must arrange for an evaluation by a rating physician or chiropractor to determine the degree of your PPD. The  amount of your PPD award depends on the date of injury, the results of the PPD evaluation and your age and wage.    Permanent Total Disability (PTD): If you are medically certified by a treating physician or chiropractor as permanently and totally disabled  and have been granted a PTD status by your insurer, you are entitled to receive monthly benefits not to exceed 66 2/3% of your average  monthly wage. The amount of your PTD payments is subject to reduction if you previously received a PPD award.    Vocational Rehabilitation Services: You may be eligible for vocational rehabilitation services if you are  unable to return to the job due to a  permanent physical impairment or permanent restrictions as a result of your injury or occupational disease.    Transportation and Per Jude Reimbursement: You may be eligible for travel expenses and per jude associated with medical treatment.    Reopening: You may be able to reopen your claim if your condition worsens after claim closure.    Appeal Process: If you disagree with a written determination issued by the insurer or the insurer does not respond to your request, you may  appeal to the Department of Administration, , by following the instructions contained in your determination letter. You must  appeal the determination within 70 days from the date of the determination letter at [address], [address], [place], [place]  [address], or [address], [address], [place], [place] [address]. If you disagree with the  decision, you may appeal to the  Department of Administration, . You must file your appeal within 30 days from the date of the  decision  letter at [address], [address], [place], [place] [address], or [address] [facility] [address], [place] [address]. If you  disagree with a decision of an , you may file a petition for judicial review with the District Court. You must do so within 30  days of the Appeal Officer’s decision. You may be represented by an  at your own expense or you may contact the [facility] for possible  representation.    [place]  for Injured Workers (NAIW): If you disagree with a  decision, you may request that NAIW represent you  without charge at an  Hearing. For information regarding denial of benefits, you may contact the [facility] at: [address], [place] [address], [phone], or [address], [place] [address], [phone]    To File a Complaint  with the Division: If you wish to file a complaint with the  of the Division of Industrial Relations (DIR),  please contact the Workers’ Compensation Section, [address], [address], [place], [place] [address], telephone [phone], or  [address], [address], [place], [place] [address], telephone [phone].    For assistance with Workers’ Compensation Issues: you may contact the Office of the Governor Consumer Health Assistance, [address] [place] [address], [address], [place], [place] [address], Toll Free 1-472.877.8146, Web site: [email or web address], E-mail  [email or web address].                                                                                                                                                                                                                                   __________________________________________________________________                                                                   _________________                Employee Name / Signature                                                                                                                                                       Date                                                                                                                                                                                                     D-2 (rev. 10/07)

## 2018-11-22 ENCOUNTER — OCCUPATIONAL MEDICINE (OUTPATIENT)
Dept: URGENT CARE | Facility: PHYSICIAN GROUP | Age: 33
End: 2018-11-22
Payer: COMMERCIAL

## 2018-11-22 VITALS
RESPIRATION RATE: 18 BRPM | TEMPERATURE: 98.6 F | HEART RATE: 74 BPM | OXYGEN SATURATION: 98 % | WEIGHT: 155 LBS | DIASTOLIC BLOOD PRESSURE: 78 MMHG | BODY MASS INDEX: 29.29 KG/M2 | SYSTOLIC BLOOD PRESSURE: 130 MMHG

## 2018-11-22 DIAGNOSIS — S39.012D LOW BACK STRAIN, SUBSEQUENT ENCOUNTER: ICD-10-CM

## 2018-11-22 DIAGNOSIS — S46.811D TRAPEZIUS MUSCLE STRAIN, RIGHT, SUBSEQUENT ENCOUNTER: ICD-10-CM

## 2018-11-22 DIAGNOSIS — S39.012D STRAIN OF LUMBAR REGION, SUBSEQUENT ENCOUNTER: ICD-10-CM

## 2018-11-22 PROCEDURE — 99214 OFFICE O/P EST MOD 30 MIN: CPT | Mod: 29 | Performed by: FAMILY MEDICINE

## 2018-11-22 RX ORDER — CYCLOBENZAPRINE HCL 10 MG
10 TABLET ORAL 3 TIMES DAILY PRN
COMMUNITY
End: 2018-11-26

## 2018-11-22 RX ORDER — KETOROLAC TROMETHAMINE 30 MG/ML
60 INJECTION, SOLUTION INTRAMUSCULAR; INTRAVENOUS ONCE
Status: COMPLETED | OUTPATIENT
Start: 2018-11-22 | End: 2018-11-22

## 2018-11-22 RX ADMIN — KETOROLAC TROMETHAMINE 60 MG: 30 INJECTION, SOLUTION INTRAMUSCULAR; INTRAVENOUS at 13:06

## 2018-11-22 ASSESSMENT — ENCOUNTER SYMPTOMS
FEVER: 0
COUGH: 0
NAUSEA: 0
SHORTNESS OF BREATH: 0
CONSTIPATION: 0
DIARRHEA: 0
HEADACHES: 0
CHILLS: 0
BLURRED VISION: 0
MYALGIAS: 0
VOMITING: 0
PALPITATIONS: 0
WHEEZING: 0
ABDOMINAL PAIN: 0
PHOTOPHOBIA: 0
BACK PAIN: 1
NECK PAIN: 1
TINGLING: 0
DOUBLE VISION: 0

## 2018-11-22 NOTE — PROGRESS NOTES
Physical Exam   Constitutional: She is oriented to person, place, and time. She appears well-developed and well-nourished. No distress.   HENT:   Head: Normocephalic and atraumatic.   Neck: Normal range of motion. Muscular tenderness present. No spinous process tenderness present.       Cardiovascular: Normal rate.    Pulmonary/Chest: Effort normal. No respiratory distress.   Musculoskeletal:        Right shoulder: Normal.        Left shoulder: Normal.        Right elbow: Normal.       Left elbow: Normal.        Left wrist: Normal.        Cervical back: She exhibits tenderness (On the right paraspinal region by the shoulders). She exhibits normal range of motion, no bony tenderness, no swelling, no edema, no deformity, no laceration and no pain.        Thoracic back: She exhibits tenderness, pain and spasm. She exhibits normal range of motion, no bony tenderness, no swelling, no edema, no deformity and no laceration.        Lumbar back: She exhibits tenderness, pain and spasm. She exhibits normal range of motion, no bony tenderness, no swelling, no edema, no deformity and no laceration.        Back:    Neurological: She is oriented to person, place, and time. She has normal strength. She displays normal reflexes. No sensory deficit.   Negative straight leg raising bilaterally   Skin: Skin is warm. She is not diaphoretic. No pallor.

## 2018-11-22 NOTE — LETTER
[address] [place] [address]  Phone:  [phone] - Fax:  [phone]   [facility] Progress Report and Disability Certification  Date of Service: 11/22/2018   No Show:  No  Date / Time of Next Visit: 11/25/2018   Claim Information   Patient Name: Marcial Gao  Claim Number:     Employer: [employer]  Date of Injury: 11/20/2018     Insurer / TPA: [employer]  ID / SSN:     Occupation: Medical Assistant  Diagnosis: Diagnoses of Strain of lumbar region, subsequent encounter, Low back strain, subsequent encounter, and Trapezius muscle strain, right, subsequent encounter were pertinent to this visit.    Medical Information   Related to Industrial Injury? Yes    Subjective Complaints:  DOI 11/20/2018 follow up visit. Patient presents for follow up of lower back and right trapezius strain. Rates her pain as 6/10. She took Ibuprofen yesterday with some relief of pain, but has not taken any today. She would like a Toradol shot here in the office. She took Flexeril last night and states it made her drowsy this morning.   Objective Findings: ROM and muscular strength normal. Spasm to right upper back and point tenderness to lower back. Cap refill <2 .  Denies numbness or tingling   Pre-Existing Condition(s): None   Assessment:   Condition Same    Status: Additional Care Required  Permanent Disability:No    Plan:      Diagnostics:      Comments:       Disability Information   Status: Released to Restricted Duty    From:  11/22/2018  Through: 11/25/2018 Restrictions are: Temporary   Physical Restrictions   Sitting:    Standing:  < or = to 2 hrs/day Stooping:    Bending:      Squatting:  < or = to 4 hrs/day Walking:    Climbing:    Pushing:  < or = to 1 hr/day   Pulling:  < or = to 1 hr/day Other:    Reaching Above Shoulder (L):   Reaching Above Shoulder (R):       Reaching Below Shoulder (L):    Reaching Below Shoulder (R):     Not to exceed Weight Limits   Carrying(hrs):   Weight Limit(lb):   Lifting(hrs):   Weight  Limit(lb):     Comments:      Repetitive Actions   Hands: i.e. Fine Manipulations from Grasping:     Feet: i.e. Operating Foot Controls:     Driving / Operate Machinery:     Physician Name: BELEN Gooden Physician Signature: OZIEL Nolen e-Signature: Dr. Desmond Hill, Medical Director   Clinic Name / Location: [address] [address] Clinic Phone Number: Dept: [phone]   Appointment Time: 12:00 Pm Visit Start Time: 12:45 PM   Check-In Time:  12:11 Pm Visit Discharge Time: 1:18 PM   Original-Treating Physician or Chiropractor    Page 2-Insurer/TPA    Page 3-Employer    Page 4-Employee

## 2018-11-22 NOTE — PROGRESS NOTES
Subjective:   Marcial Gao is a 33 y.o. female who presents for Back Pain (WC FV neck, left wrist, upper/lower back )    DOI 11/20/2018 follow up visit. Patient presents for follow up of lower back and right trapezius strain. Rates her pain as 6/10. She took Ibuprofen yesterday with some relief of pain, but has not taken any today. She would like a Toradol shot here in the office. She took Flexeril last night and states it made her drowsy this morning.   HPI  Review of Systems   Constitutional: Negative for chills and fever.   Eyes: Negative for blurred vision, double vision and photophobia.   Respiratory: Negative for cough, shortness of breath and wheezing.    Cardiovascular: Negative for chest pain and palpitations.   Gastrointestinal: Negative for abdominal pain, constipation, diarrhea, nausea and vomiting.   Musculoskeletal: Positive for back pain and neck pain. Negative for joint pain and myalgias.   Neurological: Negative for tingling and headaches.     Allergies   Allergen Reactions   • Latex Hives     PMH:  has a past medical history of Hyperlipidemia; Osteoarthritis; and Vitamin D deficiency disease.  MEDS:   Current Outpatient Prescriptions:   •  cyclobenzaprine (FLEXERIL) 10 MG Tab, Take 10 mg by mouth 3 times a day as needed., Disp: , Rfl:   •  ibuprofen (MOTRIN) 200 MG Tab, Take 200 mg by mouth every 6 hours as needed., Disp: , Rfl:     Current Facility-Administered Medications:   •  ketorolac (TORADOL) injection 60 mg, 60 mg, Intramuscular, Once, Amelie Jacome, A.P.R.NDinh  ALLERGIES:   Allergies   Allergen Reactions   • Latex Hives     SURGHX: History reviewed. No pertinent surgical history.  SOCHX:  reports that she has never smoked. She has never used smokeless tobacco. She reports that she drinks alcohol. She reports that she does not use drugs.  FH: Family history was reviewed, no pertinent findings to report     Objective:   /78   Pulse 74   Temp 37 °C (98.6 °F)   Resp 18   Wt  70.3 kg (155 lb)   SpO2 98%   BMI 29.29 kg/m²   Physical Exam   Constitutional: Vital signs are normal. She appears well-developed and well-nourished. No distress.   HENT:   Head: Normocephalic.   Right Ear: Hearing normal.   Left Ear: Hearing normal.   Nose: Nose normal.   Eyes: Pupils are equal, round, and reactive to light.   Cardiovascular: Normal rate, regular rhythm and normal heart sounds.    Pulmonary/Chest: Effort normal and breath sounds normal. No respiratory distress. She has no wheezes. She has no rales.   Musculoskeletal:        Lumbar back: She exhibits tenderness and spasm. She exhibits normal range of motion.        Back:    Neurological: She has normal reflexes.   No numbness or tingling   Skin: Skin is warm. Capillary refill takes less than 2 seconds. She is not diaphoretic.   No redness or swelling to back   Vitals reviewed.    ROM and muscular strength normal. Spasm to right upper back and point tenderness to lower back. Cap refill <2 .  Denies numbness or tingling   Assessment/Plan:   Assessment    1. Strain of lumbar region, subsequent encounter    2. Low back strain, subsequent encounter    3. Trapezius muscle strain, right, subsequent encounter    Other orders  - cyclobenzaprine (FLEXERIL) 10 MG Tab; Take 10 mg by mouth 3 times a day as needed.  - ketorolac (TORADOL) injection 60 mg; 2 mL by Intramuscular route Once.  Discussed taking half of the Flexeril at bedtime instead of full pill.    Works restrictions per D39. Patient to follow up in 3 days.    Apply heat to affected area, 10 minutes at a time, PRN pain    Differential diagnosis, natural history, supportive care, and indications for immediate follow-up discussed.     The case was discussed and reviewed with Dr Whipple during Amelie RICE's training period.

## 2018-11-22 NOTE — PROGRESS NOTES
Subjective:      Marcial Gao is a 33 y.o. female who presents with Back Pain (WC, injured upper back,lower back and neck pain, left wrist,crushed by a patient yesterday)      Date of injury: 11/20/2018  Patient is here for evaluation of a back injury that happened yesterday while working.  She works with a surgeon.  Yesterday she she was trying to help a patient be transferred to stretcher and the weight of the patient fell and fell on her.  She did not have any immediate pain.  Later on in the evening she noticed increasing back stiffness.  Currently she is complaining of soreness on the right side of the shoulder posteriorly also and across the mid back and also in the lower back but denies any radiculopathy in the upper or lower extremity including muscle weakness, or loss of bladder or bowel control.  She denies any prior back problems.  She went to work today but then decided to come here for work related injury evaluation.  She reports that she took two 800 mg of ibuprofen this morning and two 800 mg of ibuprofen this afterno  on.  She has a weak stomach.  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     HPI    ROS       Objective:     /82 (BP Location: Left arm, Patient Position: Sitting, BP Cuff Size: Adult)   Pulse 89   Temp 36.6 °C (97.9 °F)   Resp 16   Wt 70.3 kg (155 lb)   SpO2 97%   BMI 29.29 kg/m²      Physical Exam    Physical Exam   Constitutional: She is oriented to person, place, and time. She appears well-developed and well-nourished. No distress.   HENT:   Head: Normocephalic and atraumatic.   Neck: Normal range of motion. Muscular tenderness present. No spinous process tenderness present.   Cardiovascular: Normal rate.    Pulmonary/Chest: Effort normal. No respiratory distress.   Musculoskeletal:        Right shoulder: Normal.        Left shoulder: Normal.        Right elbow: Normal.       Left elbow:  Normal.        Left wrist: Normal.        Cervical back: She exhibits tenderness (On the right paraspinal region by the shoulders). She exhibits normal range of motion, no bony tenderness, no swelling, no edema, no deformity, no laceration and no pain.        Thoracic back: She exhibits tenderness, pain and spasm. She exhibits normal range of motion, no bony tenderness, no swelling, no edema, no deformity and no laceration.        Lumbar back: She exhibits tenderness, pain and spasm. She exhibits normal range of motion, no bony tenderness, no swelling, no edema, no deformity and no laceration.        Back:    Neurological: She is oriented to person, place, and time. She has normal strength. She displays normal reflexes. No sensory deficit.   Negative straight leg raising bilaterally   Skin: Skin is warm. She is not diaphoretic. No pallor.          Assessment/Plan:     ASSESSMENT:PLAN:  1. Low back strain, initial encounter    2. Strain of lumbar region, initial encounter    3. Trapezius strain, right, initial encounter      Rest, icing or heat prn  She had enough NSAIDs on board today and would not recommend Toradol injection more than we would consider giving her that tomorrow and recommend follow-up tomorrow.  X-ray none needed given the history and physical on patient like to hold off and agrees with the plan for now  She received a prescription for cyclobenzaprine which she can try at night as needed  Follow-up tomorrow in urgent care  We will also recommend a follow-up on Sunday

## 2018-11-22 NOTE — PATIENT INSTRUCTIONS
You may use cyclobenzaprine 10 mg at night as needed.  Please avoid any further ibuprofen or Aleve  Icing or heat as needed  Follow-up tomorrow   Plan also to follow-up on Sunday

## 2018-11-25 ENCOUNTER — OCCUPATIONAL MEDICINE (OUTPATIENT)
Dept: URGENT CARE | Facility: PHYSICIAN GROUP | Age: 33
End: 2018-11-25
Payer: COMMERCIAL

## 2018-11-25 VITALS
HEIGHT: 61 IN | HEART RATE: 96 BPM | TEMPERATURE: 98.6 F | SYSTOLIC BLOOD PRESSURE: 96 MMHG | RESPIRATION RATE: 16 BRPM | DIASTOLIC BLOOD PRESSURE: 50 MMHG | BODY MASS INDEX: 28.7 KG/M2 | OXYGEN SATURATION: 97 % | WEIGHT: 152 LBS

## 2018-11-25 DIAGNOSIS — S39.012D STRAIN OF LUMBAR REGION, SUBSEQUENT ENCOUNTER: ICD-10-CM

## 2018-11-25 DIAGNOSIS — S46.811D STRAIN OF RIGHT TRAPEZIUS MUSCLE, SUBSEQUENT ENCOUNTER: ICD-10-CM

## 2018-11-25 PROCEDURE — 99213 OFFICE O/P EST LOW 20 MIN: CPT | Performed by: EMERGENCY MEDICINE

## 2018-11-25 ASSESSMENT — ENCOUNTER SYMPTOMS
BACK PAIN: 1
SPEECH CHANGE: 0
NECK PAIN: 1
FEVER: 0
EYE REDNESS: 0
EYE DISCHARGE: 0
COUGH: 0
NAUSEA: 0
NERVOUS/ANXIOUS: 0
VOMITING: 0
CHILLS: 0
SENSORY CHANGE: 0
ABDOMINAL PAIN: 0

## 2018-11-25 NOTE — LETTER
[address] [address]  Phone:  [phone] - Fax:  [phone]   [facility] Progress Report and Disability Certification  Date of Service: 11/25/2018   No Show:  No  Date / Time of Next Visit: 11/30/2018   Claim Information   Patient Name: Marcial Gao  Claim Number:     Employer: [employer]  Date of Injury: 11/20/2018     Insurer / TPA: [employer]  ID / SSN:     Occupation: Medical Assistant  Diagnosis: Diagnoses of Strain of lumbar region, subsequent encounter and Strain of right trapezius muscle, subsequent encounter were pertinent to this visit.    Medical Information   Related to Industrial Injury? Yes    Subjective Complaints:  Injury 11/20/2018.  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  She initially had significant discomfort but states currently she is approximately 50% improved with her left upper extremity symptoms resolved in the having right parathoracic as well left lumbar discomfort.  She has no bowel or bladder discomfort no fever chills nausea or vomiting.   Objective Findings: Vital signs are blood pressure 96/50, pulse of 96, respiratory rate 16 with a temperature of 37 degrees and pulse oximetry of 97% on room air.    Patient is tender on her right parathoracic musculature adjacent to her scapula and up into her neck on the right side only.  She also complains of lumbar paraspinous discomfort.  Straight leg raise is 90 degrees prior laterally reflexes are symmetric at 2+ with 5/5 dorsiflexion of her great toes.  She has no radiculopathy.   Pre-Existing Condition(s): No pre-existing conditions.   Assessment:   Condition Improved  Comments:Patient states she is 50% improved I offered her physical therapy which she declined wants to see how she will do at work she does not return to work  since the injury.    Status: Additional Care Required  Permanent Disability:No    Plan:   Comments:Patient will continue the use of 400-600 mg of Advil over-the-counter with a meal every 6 hours.    Diagnostics:      Comments:       Disability Information   Status: Released to Restricted Duty    From:  11/25/2018  Through: 11/30/2018 Restrictions are: Temporary   Physical Restrictions   Sitting:  < or = to 4 hrs/day Standing:  < or = to 4 hrs/day  Comments:Please asked to be allowed to stand using the computer Stooping:  < or = to 2 hrs/day Bending:  < or = to 2 hrs/day   Squatting:  < or = to 2 hrs/day Walking:    Climbing:    Pushing:  < or = to 1 hr/day   Pulling:  < or = to 1 hr/day Other:    Reaching Above Shoulder (L):   Reaching Above Shoulder (R):       Reaching Below Shoulder (L):    Reaching Below Shoulder (R):      Not to exceed Weight Limits   Carrying(hrs):   Weight Limit(lb): < or = to 25 pounds Lifting(hrs):   Weight  Limit(lb): < or = to 25 pounds   Comments: Patient will have modified lifting carrying restrictions limited to 25 pounds per day.  In addition she will use heat to her back at home she has a heating blanket.  As well over-the-counter 400-600 mg p.o. every 6 hours as needed pain.    Repetitive Actions   Hands: i.e. Fine Manipulations from Grasping:     Feet: i.e. Operating Foot Controls:     Driving / Operate Machinery:     Physician Name: aKrlos Ballard M.D. Physician Signature: DERICK Lima M.D. e-Signature: Dr. Desmond Hill, Medical Director   Clinic Name / Location: [address] [address] Clinic Phone Number: Dept: [phone]   Appointment Time: 12:00 Pm Visit Start Time: 12:19 PM   Check-In Time:  12:10 Pm Visit Discharge Time:  1:24 PM   Original-Treating Physician or Chiropractor    Page 2-Insurer/TPA    Page 3-Employer    Page 4-Employee

## 2018-11-25 NOTE — PROGRESS NOTES
Subjective:      Marcial Gao is a 33 y.o. female who presents with Back Pain (Back/neck pain W/C FV)      Injury 11/20/2018.  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  She initially had significant discomfort but states currently she is approximately 50% improved with her left upper extremity symptoms resolved in the having right parathoracic as well left lumbar discomfort.  She has no bowel or bladder discomfort no fever chills nausea or vomiting.     HPI  PMH:  has a past medical history of Hyperlipidemia; Osteoarthritis; and Vitamin D deficiency disease.  MEDS:   Current Outpatient Prescriptions:   •  cyclobenzaprine (FLEXERIL) 10 MG Tab, Take 10 mg by mouth 3 times a day as needed., Disp: , Rfl:   •  ibuprofen (MOTRIN) 200 MG Tab, Take 200 mg by mouth every 6 hours as needed., Disp: , Rfl:   ALLERGIES:   Allergies   Allergen Reactions   • Latex Hives     SURGHX: No past surgical history on file.  SOCHX:  reports that she has never smoked. She has never used smokeless tobacco. She reports that she drinks alcohol. She reports that she does not use drugs.  FH: family history includes Psychiatry in her mother and sister.  Review of Systems   Constitutional: Negative for chills and fever.   Eyes: Negative for discharge and redness.   Respiratory: Negative for cough.    Gastrointestinal: Negative for abdominal pain, nausea and vomiting.   Musculoskeletal: Positive for back pain and neck pain.        Patient has pain related to left lumbar back pain as well as right-sided parathoracic pain.   Skin: Negative for rash.   Neurological: Negative for sensory change and speech change.   Psychiatric/Behavioral: The patient is not nervous/anxious.           Objective:     BP (!) 96/50 (BP Location: Left arm, Patient Position: Sitting, BP Cuff Size: Adult)   Pulse 96   Temp 37 °C (98.6 °F) (Temporal)   Resp 16   Ht  "1.549 m (5' 1\")   Wt 68.9 kg (152 lb)   SpO2 97%   BMI 28.72 kg/m²      Physical Exam    Vital signs are blood pressure 96/50, pulse of 96, respiratory rate 16 with a temperature of 37 degrees and pulse oximetry of 97% on room air.    Patient is tender on her right parathoracic musculature adjacent to her scapula and up into her neck on the right side only.  She also complains of lumbar paraspinous discomfort.  Straight leg raise is 90 degrees prior laterally reflexes are symmetric at 2+ with 5/5 dorsiflexion of her great toes.  She has no radiculopathy.       Assessment/Plan:     1. Strain of lumbar region, subsequent encounter      2. Strain of right trapezius muscle, subsequent encounter    Patient will return in 5 days for reevaluation.  She would like to have her sitting and standing ratio approximately 50-50 well during her work.  She will return for bowel or bladder incontinence fever severe pain.    At this stage she declined physical therapy though she states she is only approximately 50% improved.  Hopefully the percentage will increase within the next 5 days.  "

## 2018-11-26 ENCOUNTER — OCCUPATIONAL MEDICINE (OUTPATIENT)
Dept: URGENT CARE | Facility: PHYSICIAN GROUP | Age: 33
End: 2018-11-26
Payer: COMMERCIAL

## 2018-11-26 VITALS
SYSTOLIC BLOOD PRESSURE: 132 MMHG | DIASTOLIC BLOOD PRESSURE: 88 MMHG | WEIGHT: 152 LBS | BODY MASS INDEX: 28.72 KG/M2 | TEMPERATURE: 99.5 F | OXYGEN SATURATION: 99 % | RESPIRATION RATE: 20 BRPM | HEART RATE: 122 BPM

## 2018-11-26 DIAGNOSIS — S39.012D ACUTE MYOFASCIAL STRAIN OF LUMBAR REGION, SUBSEQUENT ENCOUNTER: ICD-10-CM

## 2018-11-26 PROCEDURE — 99214 OFFICE O/P EST MOD 30 MIN: CPT | Performed by: FAMILY MEDICINE

## 2018-11-26 RX ORDER — METHOCARBAMOL 500 MG/1
500 TABLET, FILM COATED ORAL 3 TIMES DAILY PRN
Qty: 30 TAB | Refills: 0 | Status: SHIPPED | OUTPATIENT
Start: 2018-11-26 | End: 2018-12-12 | Stop reason: SDUPTHER

## 2018-11-26 RX ORDER — KETOROLAC TROMETHAMINE 30 MG/ML
60 INJECTION, SOLUTION INTRAMUSCULAR; INTRAVENOUS ONCE
Status: COMPLETED | OUTPATIENT
Start: 2018-11-26 | End: 2018-11-26

## 2018-11-26 RX ORDER — MELOXICAM 15 MG/1
15 TABLET ORAL DAILY
Qty: 30 TAB | Refills: 0 | Status: SHIPPED | OUTPATIENT
Start: 2018-11-26 | End: 2018-11-28

## 2018-11-26 RX ADMIN — KETOROLAC TROMETHAMINE 60 MG: 30 INJECTION, SOLUTION INTRAMUSCULAR; INTRAVENOUS at 19:47

## 2018-11-26 NOTE — LETTER
[address] [address]  Phone:  [phone] - Fax:  [phone]   [facility] Progress Report and Disability Certification  Date of Service: 11/26/2018   No Show:  No  Date / Time of Next Visit: 11/30/2018   Claim Information   Patient Name: Marcial Gao  Claim Number:     Employer: [employer]  Date of Injury: 11/20/2018     Insurer / TPA: [employer]  ID / SSN:     Occupation: Medical Assistant  Diagnosis: The encounter diagnosis was Acute myofascial strain of lumbar region, subsequent encounter.    Medical Information   Related to Industrial Injury? Yes    Subjective Complaints:  Injury 11/20/2018.  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  Follow-up 10/26/2018: Patient states that she has had significant increase in pain and spasm throughout her lower back medial back and shoulder to the point that she has been vomiting from the pain.  Patient has been vomiting for the last hour secondary to pain and and vomiting was induced with palpation to lower lumbar spinal musculature.  Patient endorses significant sedation from Flexeril 5 mg to the point where she did not feel safe driving 12 hours after taking a dose.  Patient has been using ibuprofen intermittently with mild effect.   Objective Findings: Significant spasm throughout paralumbar musculature and through right trapezius.  Tenderness to palpation bilateral piriformis.  DTRs intact bilateral lower extremities.  Sensation intact bilateral lower extremities.  Strength intact bilateral lower extremities.   Pre-Existing Condition(s):     Assessment:   Condition Worsened    Status: Additional Care Required  Permanent Disability:No    Plan: PT    Diagnostics:      Comments:       Disability Information   Status: Released to Restricted Duty    From:   2018  Through: 2018 Restrictions are: Temporary   Physical Restrictions   Sitting:    Standing:    Stoopin hrs/day Bendin hrs/day   Squattin hrs/day Walking:    Climbin hrs/day Pushin hrs/day   Pulling:  < or = to 1 hr/day Other:    Reaching Above Shoulder (L):   Reaching Above Shoulder (R):       Reaching Below Shoulder (L):    Reaching Below Shoulder (R):      Not to exceed Weight Limits   Carrying(hrs): 2 Weight Limit(lb): < or = to 10 pounds Lifting(hrs): 2 Weight  Limit(lb): < or = to 10 pounds   Comments:      Repetitive Actions   Hands: i.e. Fine Manipulations from Grasping:     Feet: i.e. Operating Foot Controls:     Driving / Operate Machinery:     Physician Name: Alistair Nguyen M.D. Physician Signature: ALISTAIR De Jesus M.D. e-Signature: Dr. Desmond Hill, Medical Director   Clinic Name / Location: [address] [address] Clinic Phone Number: Dept: [phone]   Appointment Time: 6:45 Pm Visit Start Time: 7:02 PM   Check-In Time:  6:48 Pm Visit Discharge Time: 7:51 Pm    Original-Treating Physician or Chiropractor    Page 2-Insurer/TPA    Page 3-Employer    Page 4-Employee

## 2018-11-28 ENCOUNTER — APPOINTMENT (OUTPATIENT)
Dept: RADIOLOGY | Facility: IMAGING CENTER | Age: 33
End: 2018-11-28
Attending: PREVENTIVE MEDICINE
Payer: COMMERCIAL

## 2018-11-28 ENCOUNTER — OCCUPATIONAL MEDICINE (OUTPATIENT)
Dept: OCCUPATIONAL MEDICINE | Facility: CLINIC | Age: 33
End: 2018-11-28
Payer: COMMERCIAL

## 2018-11-28 VITALS
WEIGHT: 155 LBS | BODY MASS INDEX: 29.27 KG/M2 | HEIGHT: 61 IN | TEMPERATURE: 97.3 F | SYSTOLIC BLOOD PRESSURE: 108 MMHG | DIASTOLIC BLOOD PRESSURE: 62 MMHG

## 2018-11-28 DIAGNOSIS — M54.2 CERVICALGIA: ICD-10-CM

## 2018-11-28 DIAGNOSIS — S39.012D STRAIN OF LUMBAR REGION, SUBSEQUENT ENCOUNTER: ICD-10-CM

## 2018-11-28 PROCEDURE — 72040 X-RAY EXAM NECK SPINE 2-3 VW: CPT | Mod: TC | Performed by: PHYSICIAN ASSISTANT

## 2018-11-28 PROCEDURE — 72100 X-RAY EXAM L-S SPINE 2/3 VWS: CPT | Mod: TC,29 | Performed by: PHYSICIAN ASSISTANT

## 2018-11-28 PROCEDURE — 99204 OFFICE O/P NEW MOD 45 MIN: CPT | Performed by: PREVENTIVE MEDICINE

## 2018-11-28 RX ORDER — PREDNISONE 20 MG/1
20 TABLET ORAL 2 TIMES DAILY
Qty: 14 TAB | Refills: 0 | Status: SHIPPED | OUTPATIENT
Start: 2018-11-28 | End: 2020-04-30

## 2018-11-28 RX ORDER — DICLOFENAC SODIUM 75 MG/1
75 TABLET, DELAYED RELEASE ORAL 2 TIMES DAILY
Qty: 60 TAB | Refills: 1 | Status: SHIPPED | OUTPATIENT
Start: 2018-11-28 | End: 2018-12-12 | Stop reason: SDUPTHER

## 2018-11-28 ASSESSMENT — PAIN SCALES - GENERAL: PAINLEVEL: 10=SEVERE PAIN

## 2018-11-28 NOTE — LETTER
"   [address],   [address] [place] [address]  Phone:  [phone] - Fax:  [phone]   [facility] Progress Report and Disability Certification  Date of Service: 11/28/2018   No Show:  No  Date / Time of Next Visit: 12/12/18 @ 4:00 PM   Claim Information   Patient Name: Marcial Gao  Claim Number:     Employer: [employer]  Date of Injury: 11/20/2018     Insurer / TPA: [employer]  ID / SSN:     Occupation: Medical Assistant  Diagnosis: Diagnoses of Cervicalgia and Strain of lumbar region, subsequent encounter were pertinent to this visit.    Medical Information   Related to Industrial Injury? Yes    Subjective Complaints:  Date of injury 11/20/2018.  Mechanism of injury- transferring patient from wheelchair to exam table.  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  She has been seen in urgent care for 4 visits.  She has been given meloxicam and methocarbamol.  Physical therapy has been prescribed but not approved.  She continues to have neck greater than lower back pain.  She complains of some \"wobbly\" feeling in her legs.  No upper extremity symptoms currently.  No urinary symptoms.   Objective Findings: Appearance: Well-developed, well-nourished.   Mental Status: Mood and Affect normal. Pleasant. Cooperative. Appropriate.   ENT: Oropharynx clear. Moist mucous membranes. Hearing normal.   Eyes: Pupils reactive. Conjunctiva normal. No scleral icterus.   Neck: Trachea Midline. No thyromegaly. No masses.  Cardiovascular: Normal rate. Regular rhythm. Normal heart sounds.   Chest: Effort normal. Breath sounds clear.   Skin: Skin is warm and dry. No rash.   Musculoskeletal: Cervical spine shows good range of motion with mild to moderate pain.  Mild tenderness in the cervical paraspinal area.  Lower back shows minimal tenderness.  Pain with flexion and extension.  Negative straight leg raise.  Distal " neurovascular intact.     Pre-Existing Condition(s):     Assessment:   Condition Same    Status: Additional Care Required  Permanent Disability:No    Plan: MedicationPTDiagnostics    Diagnostics: X-ray    Comments:       Disability Information   Status: Released to Restricted Duty    From:  11/28/2018  Through: 12/13/2018 Restrictions are: Temporary   Physical Restrictions   Sitting:    Standing:    Stooping:    Bending:  < or = to 1 hr/day   Squatting:    Walking:    Climbing:    Pushing:  < or = to 1 hr/day   Pulling:  < or = to 1 hr/day Other:    Reaching Above Shoulder (L): < or = to 1 hr/day Reaching Above Shoulder (R): < or = 1 hrs/day     Reaching Below Shoulder (L):    Reaching Below Shoulder (R):      Not to exceed Weight Limits   Carrying(hrs):   Weight Limit(lb):   Lifting(hrs):   Weight  Limit(lb): < or = to 10 pounds   Comments: Sedentary work recommended    Repetitive Actions   Hands: i.e. Fine Manipulations from Grasping:     Feet: i.e. Operating Foot Controls:     Driving / Operate Machinery:     Physician Name: Tu Valero M.D. Physician Signature: TU Kirby M.D. e-Signature: Dr. Desmond Hill, Medical Director   Clinic Name / Location: [address],   [address] [place] [address] Clinic Phone Number: Dept: [phone]   Appointment Time: 3:45 Pm Visit Start Time: 3:46 PM   Check-In Time:  3:39 Pm Visit Discharge Time:  4:38 PM   Original-Treating Physician or Chiropractor    Page 2-Insurer/TPA    Page 3-Employer    Page 4-Employee

## 2018-11-29 NOTE — PROGRESS NOTES
"Subjective:      Marcial Gao is a 33 y.o. female who presents with Other (WC FV DOI 11-20-18 BACK,NECK, (L) ARM, (L) WRIST -Same-)      Date of injury 11/20/2018.  Mechanism of injury- transferring patient from wheelchair to exam table.  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  She has been seen in urgent care for 4 visits.  She has been given meloxicam and methocarbamol.  Physical therapy has been prescribed but not approved.  She continues to have neck greater than lower back pain.  She complains of some \"wobbly\" feeling in her legs.  No upper extremity symptoms currently.  No urinary symptoms.     HPI    ROS  Comprehensive medical history form reviewed. Pertinent positives and negatives included in HPI.    PFSH: reviewed in Epic    PMH:  has a past medical history of Hyperlipidemia; Osteoarthritis; and Vitamin D deficiency disease.  MEDS:   Current Outpatient Prescriptions:   •  diclofenac EC (VOLTAREN) 75 MG Tablet Delayed Response, Take 1 Tab by mouth 2 times a day., Disp: 60 Tab, Rfl: 1  •  predniSONE (DELTASONE) 20 MG Tab, Take 1 Tab by mouth 2 times a day., Disp: 14 Tab, Rfl: 0  •  methocarbamol (ROBAXIN) 500 MG Tab, Take 1 Tab by mouth 3 times a day as needed (muscle spasm)., Disp: 30 Tab, Rfl: 0  ALLERGIES:   Allergies   Allergen Reactions   • Latex Hives     SURGHX: No past surgical history on file.  SOCHX:  reports that she has never smoked. She has never used smokeless tobacco. She reports that she drinks alcohol. She reports that she does not use drugs.  Work Status: Works as a medical assistant for [employer]  FH: No pertinent hereditary disorders.        Objective:     /62 (BP Location: Right arm, Patient Position: Sitting)   Temp 36.3 °C (97.3 °F)   Ht 1.549 m (5' 1\")   Wt 70.3 kg (155 lb)   BMI 29.29 kg/m²      Physical Exam    Appearance: Well-developed, well-nourished.   Mental Status: Mood and Affect normal. Pleasant. " Cooperative. Appropriate.   ENT: Oropharynx clear. Moist mucous membranes. Hearing normal.   Eyes: Pupils reactive. Conjunctiva normal. No scleral icterus.   Neck: Trachea Midline. No thyromegaly. No masses.  Cardiovascular: Normal rate. Regular rhythm. Normal heart sounds.   Chest: Effort normal. Breath sounds clear.   Skin: Skin is warm and dry. No rash.   Musculoskeletal: Cervical spine shows good range of motion with mild to moderate pain.  Mild tenderness in the cervical paraspinal area.  Lower back shows minimal tenderness.  Pain with flexion and extension.  Negative straight leg raise.  Distal neurovascular intact.         Assessment/Plan:     1. Cervicalgia  2. Strain of lumbar region, subsequent encounter  New to occupational health from urgent care  - DX-CERVICAL SPINE-2 OR 3 VIEWS; Future  - DX-LUMBAR SPINE-2 OR 3 VIEWS; Future  - diclofenac EC (VOLTAREN) 75 MG Tablet Delayed Response; Take 1 Tab by mouth 2 times a day.  Dispense: 60 Tab; Refill: 1  - predniSONE (DELTASONE) 20 MG Tab; Take 1 Tab by mouth 2 times a day.  Dispense: 14 Tab; Refill: 0  Restricted activity  Recheck in 2 weeks when if not improved will consider further diagnostic studies

## 2018-11-30 NOTE — PROGRESS NOTES
Subjective:   Marcial Gao is a 33 y.o. female who presents for Back Pain (WC Back injury,Lowe back,right side of neck, nausea, legs feel weak)    Injury 11/20/2018.  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  Follow-up 10/26/2018: Patient states that she has had significant increase in pain and spasm throughout her lower back medial back and shoulder to the point that she has been vomiting from the pain.  Patient has been vomiting for the last hour secondary to pain and and vomiting was induced with palpation to lower lumbar spinal musculature.  Patient endorses significant sedation from Flexeril 5 mg to the point where she did not feel safe driving 12 hours after taking a dose.  Patient has been using ibuprofen intermittently with mild effect.   HPI  ROS  Allergies   Allergen Reactions   • Latex Hives      Objective:   /88 (BP Location: Left arm, Patient Position: Sitting, BP Cuff Size: Adult)   Pulse (!) 122   Temp 37.5 °C (99.5 °F)   Resp 20   Wt 68.9 kg (152 lb)   SpO2 99%   BMI 28.72 kg/m²   Physical Exam   Constitutional: She is oriented to person, place, and time. She appears well-developed and well-nourished. No distress.   HENT:   Head: Normocephalic and atraumatic.   Eyes: Pupils are equal, round, and reactive to light. Conjunctivae and EOM are normal.   Cardiovascular: Normal rate and regular rhythm.    No murmur heard.  Pulmonary/Chest: Effort normal and breath sounds normal. No respiratory distress.   Abdominal: Soft. She exhibits no distension. There is no tenderness.   Neurological: She is alert and oriented to person, place, and time. She has normal reflexes. No sensory deficit.   Skin: Skin is warm and dry.   Psychiatric: She has a normal mood and affect.     Significant spasm throughout paralumbar musculature and through right trapezius.  Tenderness to palpation bilateral  piriformis.  DTRs intact bilateral lower extremities.  Sensation intact bilateral lower extremities.  Strength intact bilateral lower extremities.   Assessment/Plan:   1. Acute myofascial strain of lumbar region, subsequent encounter  - methocarbamol (ROBAXIN) 500 MG Tab; Take 1 Tab by mouth 3 times a day as needed (muscle spasm).  Dispense: 30 Tab; Refill: 0  - ketorolac (TORADOL) injection 60 mg; 2 mL by Intramuscular route Once.  - REFERRAL TO PHYSICAL THERAPY Reason for Therapy: Eval/Treat/Report    Differential diagnosis, natural history, supportive care, and indications for immediate follow-up discussed.

## 2018-12-12 ENCOUNTER — OCCUPATIONAL MEDICINE (OUTPATIENT)
Dept: OCCUPATIONAL MEDICINE | Facility: CLINIC | Age: 33
End: 2018-12-12
Payer: COMMERCIAL

## 2018-12-12 ENCOUNTER — TELEPHONE (OUTPATIENT)
Dept: OCCUPATIONAL MEDICINE | Facility: CLINIC | Age: 33
End: 2018-12-12

## 2018-12-12 VITALS
BODY MASS INDEX: 30.02 KG/M2 | TEMPERATURE: 99 F | HEART RATE: 89 BPM | WEIGHT: 159 LBS | SYSTOLIC BLOOD PRESSURE: 102 MMHG | OXYGEN SATURATION: 99 % | DIASTOLIC BLOOD PRESSURE: 68 MMHG | HEIGHT: 61 IN

## 2018-12-12 DIAGNOSIS — S39.012D ACUTE MYOFASCIAL STRAIN OF LUMBAR REGION, SUBSEQUENT ENCOUNTER: ICD-10-CM

## 2018-12-12 DIAGNOSIS — S39.012D STRAIN OF LUMBAR REGION, SUBSEQUENT ENCOUNTER: ICD-10-CM

## 2018-12-12 DIAGNOSIS — M54.2 CERVICALGIA: ICD-10-CM

## 2018-12-12 PROCEDURE — 99213 OFFICE O/P EST LOW 20 MIN: CPT | Performed by: PREVENTIVE MEDICINE

## 2018-12-12 RX ORDER — METHOCARBAMOL 500 MG/1
500 TABLET, FILM COATED ORAL 3 TIMES DAILY PRN
Qty: 30 TAB | Refills: 0 | Status: SHIPPED | OUTPATIENT
Start: 2018-12-12 | End: 2020-04-30

## 2018-12-12 RX ORDER — DICLOFENAC SODIUM 75 MG/1
75 TABLET, DELAYED RELEASE ORAL 2 TIMES DAILY
Qty: 60 TAB | Refills: 1 | Status: SHIPPED | OUTPATIENT
Start: 2018-12-12 | End: 2020-04-30

## 2018-12-12 NOTE — LETTER
[address],   [address] [place] [address]  Phone:  [phone] - Fax:  [phone]   [facility] Progress Report and Disability Certification  Date of Service: 12/12/2018   No Show:  No  Date / Time of Next Visit: 1/8/2019 @ 4PM   Claim Information   Patient Name: Marcial Gao  Claim Number:     Employer: [employer]  Date of Injury: 11/20/2018     Insurer / TPA: [employer]  ID / SSN:     Occupation: Medical Assistant  Diagnosis: Diagnoses of Cervicalgia, Strain of lumbar region, subsequent encounter, and Acute myofascial strain of lumbar region, subsequent encounter were pertinent to this visit.    Medical Information   Related to Industrial Injury? Yes    Subjective Complaints:  Date of injury 11/20/2018.  Mechanism of injury- transferring patient from wheelchair to exam table.  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  Patient states that overall her back pain and neck pain is about the same.  She states it did improve with the medications but she has since run out of those.  He does get some pain down the right arm.  And had a little bit of weakness in the legs which has resolved.  Did not know the physical therapy had been approved.   Objective Findings: Cervical: No gross deformity.  Painful range of motion.  Upper extremity reflexes intact.  Lumbar: No gross deformity.  Pain with flexion.  Lower extremity reflexes intact.   Pre-Existing Condition(s):     Assessment:   Condition Same    Status: Additional Care Required  Permanent Disability:No    Plan:      Diagnostics:      Comments:  Begin physical therapy  Refill diclofenac and Robaxin  Continue restricted duty  Follow-up 3 weeks    Disability Information   Status: Released to Restricted Duty    From:  12/12/2018  Through: 1/8/2019 Restrictions are: Temporary   Physical Restrictions   Sitting:    Standing:    Stooping:  < or = to 1  hr/day Bending:  < or = to 1 hr/day   Squatting:    Walking:    Climbing:    Pushing:  < or = to 2 hrs/day   Pulling:  < or = to 2 hrs/day Other:    Reaching Above Shoulder (L):   Reaching Above Shoulder (R):       Reaching Below Shoulder (L):    Reaching Below Shoulder (R):      Not to exceed Weight Limits   Carrying(hrs):   Weight Limit(lb): < or = to 25 pounds Lifting(hrs):   Weight  Limit(lb): < or = to 25 pounds   Comments:      Repetitive Actions   Hands: i.e. Fine Manipulations from Grasping:     Feet: i.e. Operating Foot Controls:     Driving / Operate Machinery:     Physician Name: Khari Bean D.O. Physician Signature: KHARI Garcia D.O. e-Signature: Dr. Desmond Hill, Medical Director   Clinic Name / Location: [address],   [address] [place] [address] Clinic Phone Number: Dept: [phone]   Appointment Time: 4:00 Pm Visit Start Time: 4:06 PM   Check-In Time:  3:58 Pm Visit Discharge Time:  4:55 PM   Original-Treating Physician or Chiropractor    Page 2-Insurer/TPA    Page 3-Employer    Page 4-Employee

## 2018-12-13 NOTE — PROGRESS NOTES
"Subjective:      Marcial Gao is a 33 y.o. female who presents with Follow-Up ( [facility] 11-20-18 BACK,NECK, (L) ARM, (L) WRIST -better-)      Date of injury 11/20/2018.  Mechanism of injury- transferring patient from wheelchair to exam table.  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  Patient states that overall her back pain and neck pain is about the same.  She states it did improve with the medications but she has since run out of those.  He does get some pain down the right arm.  And had a little bit of weakness in the legs which has resolved.  Did not know the physical therapy had been approved.     HPI    ROS  SOCHX: Works as an MA at [employer]   FH: No pertinent family history to this problem.     Objective:     /68 (BP Location: Right arm, Patient Position: Sitting)   Pulse 89   Temp 37.2 °C (99 °F) (Temporal)   Ht 1.549 m (5' 1\")   Wt 72.1 kg (159 lb)   SpO2 99%   BMI 30.04 kg/m²      Physical Exam   Constitutional: She is oriented to person, place, and time. She appears well-developed and well-nourished.   Cardiovascular: Normal rate.    Pulmonary/Chest: Effort normal.   Neurological: She is alert and oriented to person, place, and time.   Skin: Skin is warm and dry.   Psychiatric: She has a normal mood and affect. Judgment normal.       Cervical: No gross deformity.  Painful range of motion.  Upper extremity reflexes intact.  Lumbar: No gross deformity.  Pain with flexion.  Lower extremity reflexes intact.       Assessment/Plan:     1. Cervicalgia  - diclofenac EC (VOLTAREN) 75 MG Tablet Delayed Response; Take 1 Tab by mouth 2 times a day.  Dispense: 60 Tab; Refill: 1    2. Strain of lumbar region, subsequent encounter  - diclofenac EC (VOLTAREN) 75 MG Tablet Delayed Response; Take 1 Tab by mouth 2 times a day.  Dispense: 60 Tab; Refill: 1    3. Acute myofascial strain of lumbar region, subsequent encounter  - methocarbamol (ROBAXIN) " 500 MG Tab; Take 1 Tab by mouth 3 times a day as needed (muscle spasm).  Dispense: 30 Tab; Refill: 0    Begin physical therapy  Refill diclofenac and Robaxin  Continue restricted duty  Follow-up 3 weeks, if no improvement with PT will consider MRI's

## 2018-12-13 NOTE — TELEPHONE ENCOUNTER
1. Caller Name: Marcial Gao                                           Call Back Number: [phone] (home)           Patient approves a detailed voicemail message: N/A        pt was seen in the clinic on 12/12/18 by Dr. Bean. pt showed concerned that she was not given a phone call that her PT has been approved. pt is upset because the referral was supposed to be an urgent referral but she ended up waiting for 3 weeks with no phone call that her referral has been approved. She also stated that if she had known that all she needed to do is to make an appt she could've done it early on.

## 2018-12-23 ENCOUNTER — OFFICE VISIT (OUTPATIENT)
Dept: URGENT CARE | Facility: PHYSICIAN GROUP | Age: 33
End: 2018-12-23
Payer: COMMERCIAL

## 2018-12-23 VITALS
HEART RATE: 106 BPM | RESPIRATION RATE: 16 BRPM | WEIGHT: 158 LBS | SYSTOLIC BLOOD PRESSURE: 104 MMHG | HEIGHT: 61 IN | DIASTOLIC BLOOD PRESSURE: 72 MMHG | OXYGEN SATURATION: 98 % | BODY MASS INDEX: 29.83 KG/M2 | TEMPERATURE: 97.8 F

## 2018-12-23 DIAGNOSIS — J01.01 ACUTE RECURRENT MAXILLARY SINUSITIS: ICD-10-CM

## 2018-12-23 PROCEDURE — 99213 OFFICE O/P EST LOW 20 MIN: CPT | Performed by: EMERGENCY MEDICINE

## 2018-12-23 RX ORDER — BENZONATATE 100 MG/1
100 CAPSULE ORAL 3 TIMES DAILY PRN
Qty: 60 CAP | Refills: 0 | Status: SHIPPED | OUTPATIENT
Start: 2018-12-23 | End: 2020-04-30

## 2018-12-23 RX ORDER — DOXYCYCLINE HYCLATE 100 MG
100 TABLET ORAL 2 TIMES DAILY
Qty: 20 TAB | Refills: 0 | Status: SHIPPED | OUTPATIENT
Start: 2018-12-23 | End: 2020-04-30

## 2018-12-23 ASSESSMENT — ENCOUNTER SYMPTOMS
COUGH: 1
ABDOMINAL PAIN: 0
CHILLS: 0
SINUS PAIN: 1
NECK PAIN: 0
SENSORY CHANGE: 0
NAUSEA: 0
VOMITING: 0
SPEECH CHANGE: 0
STRIDOR: 0
HEMOPTYSIS: 0
FEVER: 0
EYE DISCHARGE: 0

## 2018-12-23 NOTE — PROGRESS NOTES
"Subjective:      Marcial Gao is a 33 y.o. female who presents with Cough (with sinus pressure, sneezing x 3 days )            HPI  Pt with URI symptoms for the past three  with congestion and pressure over her sinuses as well as an allergic component.  She has no fever chills nausea vomiting or diarrhea.    .p  Review of Systems   Constitutional: Negative for chills and fever.   HENT: Positive for congestion and sinus pain.    Eyes: Negative for discharge.   Respiratory: Positive for cough. Negative for hemoptysis and stridor.    Gastrointestinal: Negative for abdominal pain, nausea and vomiting.   Musculoskeletal: Negative for neck pain.   Skin: Negative for rash.   Neurological: Negative for sensory change and speech change.          Objective:     /72 (BP Location: Left arm, Patient Position: Sitting, BP Cuff Size: Adult)   Pulse (!) 106   Temp 36.6 °C (97.8 °F) (Tympanic)   Resp 16   Ht 1.549 m (5' 1\")   Wt 71.7 kg (158 lb)   SpO2 98%   BMI 29.85 kg/m²      Physical Exam   Constitutional: She is oriented to person, place, and time. She appears well-developed and well-nourished. No distress.   HENT:   Head: Normocephalic.   Right Ear: External ear normal.   Left Ear: External ear normal.   Mouth/Throat: No oropharyngeal exudate.   Eyes: Conjunctivae are normal. Right eye exhibits no discharge. Left eye exhibits no discharge.   Neck: Neck supple.   Cardiovascular: Normal rate and regular rhythm.    Pulmonary/Chest: Effort normal and breath sounds normal. No stridor. No respiratory distress. She has no wheezes.   Abdominal: She exhibits no distension.   Musculoskeletal: Normal range of motion. She exhibits no edema or tenderness.   Lymphadenopathy:     She has no cervical adenopathy.   Neurological: She is alert and oriented to person, place, and time. Coordination normal.   Skin: Skin is warm and dry. No rash noted. She is not diaphoretic.   Psychiatric: She has a normal mood and affect. " Her behavior is normal.   Nursing note and vitals reviewed.              Assessment/Plan:     DX  URI      I am recommending the patient initiate/ continue hydration efforts including the use of a vaporizer/humidifier/ netti pot. I also recommend the pt, initiate Mucinex and  Sudafed or Dayquil if not hypertensive. In addition the patient will initiate the prescribed prescription medication/s: Vibramycin contingency rx, patient will also have Tessalon Perles as needed cough 100 mg 3 times a day.  If the patient's condition exacerbates with worsening dysphagia, shortness of breath, uncontrolled fever, headache or chest pressure he/she will return immediately to the urgent care or go to  the emergency department for further evaluation.    Karlos Ballard

## 2019-01-08 ENCOUNTER — OCCUPATIONAL MEDICINE (OUTPATIENT)
Dept: OCCUPATIONAL MEDICINE | Facility: CLINIC | Age: 34
End: 2019-01-08
Payer: COMMERCIAL

## 2019-01-08 VITALS
DIASTOLIC BLOOD PRESSURE: 78 MMHG | RESPIRATION RATE: 18 BRPM | WEIGHT: 155 LBS | SYSTOLIC BLOOD PRESSURE: 118 MMHG | TEMPERATURE: 98 F | HEART RATE: 78 BPM | HEIGHT: 61 IN | OXYGEN SATURATION: 99 % | BODY MASS INDEX: 29.27 KG/M2

## 2019-01-08 DIAGNOSIS — S39.012D STRAIN OF LUMBAR REGION, SUBSEQUENT ENCOUNTER: ICD-10-CM

## 2019-01-08 DIAGNOSIS — M54.2 CERVICALGIA: ICD-10-CM

## 2019-01-08 PROCEDURE — 99212 OFFICE O/P EST SF 10 MIN: CPT | Performed by: PREVENTIVE MEDICINE

## 2019-01-08 NOTE — LETTER
[address],   [address] [address]  Phone:  [phone] - Fax:  [phone]   [facility] Progress Report and Disability Certification  Date of Service: 1/8/2019   No Show:  No  Date / Time of Next Visit: 1/29/2019 @ 4PM   Claim Information   Patient Name: Marcial Gao  Claim Number:     Employer: [employer]  Date of Injury: 11/20/2018     Insurer / TPA: [employer]  ID / SSN:     Occupation: Medical Assistant  Diagnosis: Diagnoses of Strain of lumbar region, subsequent encounter and Cervicalgia were pertinent to this visit.    Medical Information   Related to Industrial Injury? Yes    Subjective Complaints:  Date of injury 11/20/2018.  Mechanism of injury- transferring patient from wheelchair to exam table.  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  Patient notes that she has had gradual improvement in symptoms over the last few weeks.  She has only minimal pain at this point.  She has not been requiring any medications.  There is delay in her physical therapy and is not scheduled to start until next week.  Denies radiating pain, numbness or tingling.   Objective Findings: Spine: No gross deformity.  Full range of motion of cervical and lumbar spine.  Normal gait.   Pre-Existing Condition(s):     Assessment:   Condition Improved    Status: Additional Care Required  Permanent Disability:No    Plan:      Diagnostics:      Comments:  Recommend doing approved physical therapy  OTC ibuprofen and or Tylenol as needed for pain  Continue restricted duty  Follow-up 3 weeks, if symptoms stay improved and is doing well will release from care    Disability Information   Status: Released to Restricted Duty    From:  1/8/2019  Through: 1/29/2019 Restrictions are: Temporary   Physical Restrictions   Sitting:    Standing:    Stooping:    Bending:      Squatting:    Walking:    Climbing:    Pushing:      Pulling:    Other:    Reaching Above Shoulder (L):   Reaching Above Shoulder (R):       Reaching Below Shoulder (L):    Reaching Below Shoulder (R):      Not to exceed Weight Limits   Carrying(hrs):   Weight Limit(lb): < or = to 25 pounds Lifting(hrs):   Weight  Limit(lb): < or = to 25 pounds   Comments:      Repetitive Actions   Hands: i.e. Fine Manipulations from Grasping:     Feet: i.e. Operating Foot Controls:     Driving / Operate Machinery:     Physician Name: Khari Bean D.O. Physician Signature: KHARI Garcia D.O. e-Signature: Dr. Desmond Hill, Medical Director   Clinic Name / Location: [address],   [address] [place] [id number] Clinic Phone Number: Dept: [phone]   Appointment Time: 4:00 Pm Visit Start Time: 4:05 PM   Check-In Time:  4:03 Pm Visit Discharge Time:  4:23 PM   Original-Treating Physician or Chiropractor    Page 2-Insurer/TPA    Page 3-Employer    Page 4-Employee

## 2019-01-09 NOTE — PROGRESS NOTES
"Subjective:      Marcial aGo is a 33 y.o. female who presents with Other ([facility] 11-20-18 BACK,NECK, (L) ARM, (L) WRIST, better, rm 16)      Date of injury 11/20/2018.  Mechanism of injury- transferring patient from wheelchair to exam table.  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  Patient notes that she has had gradual improvement in symptoms over the last few weeks.  She has only minimal pain at this point.  She has not been requiring any medications.  There is delay in her physical therapy and is not scheduled to start until next week.  Denies radiating pain, numbness or tingling.     HPI    ROS       Objective:     /78   Pulse 78   Temp 36.7 °C (98 °F)   Resp 18   Ht 1.549 m (5' 1\")   Wt 70.3 kg (155 lb)   SpO2 99%   BMI 29.29 kg/m²      Physical Exam    Spine: No gross deformity.  Full range of motion of cervical and lumbar spine.  Normal gait.       Assessment/Plan:     1. Strain of lumbar region, subsequent encounter  2. Cervicalgia    Recommend doing approved physical therapy  OTC ibuprofen and or Tylenol as needed for pain  Continue restricted duty  Follow-up 3 weeks, if symptoms stay improved and is doing well will release from care      "

## 2019-01-29 ENCOUNTER — OCCUPATIONAL MEDICINE (OUTPATIENT)
Dept: OCCUPATIONAL MEDICINE | Facility: CLINIC | Age: 34
End: 2019-01-29
Payer: COMMERCIAL

## 2019-01-29 VITALS
HEIGHT: 61 IN | OXYGEN SATURATION: 98 % | BODY MASS INDEX: 29.64 KG/M2 | DIASTOLIC BLOOD PRESSURE: 80 MMHG | WEIGHT: 157 LBS | SYSTOLIC BLOOD PRESSURE: 102 MMHG | HEART RATE: 100 BPM | TEMPERATURE: 99.3 F

## 2019-01-29 DIAGNOSIS — S39.012D STRAIN OF LUMBAR REGION, SUBSEQUENT ENCOUNTER: ICD-10-CM

## 2019-01-29 DIAGNOSIS — M54.2 CERVICALGIA: ICD-10-CM

## 2019-01-29 PROCEDURE — 99212 OFFICE O/P EST SF 10 MIN: CPT | Performed by: PREVENTIVE MEDICINE

## 2019-01-29 NOTE — LETTER
[address],   [address] [address]  Phone:  [phone] - Fax:  [phone]   [facility] Progress Report and Disability Certification  Date of Service: 1/29/2019   No Show:  No  Date / Time of Next Visit:  Release from care   Claim Information   Patient Name: Marcial Gao  Claim Number:     Employer: [employer]  Date of Injury: 11/20/2018     Insurer / TPA: [employer]  ID / SSN:     Occupation: Medical Assistant  Diagnosis: Diagnoses of Strain of lumbar region, subsequent encounter and Cervicalgia were pertinent to this visit.    Medical Information   Related to Industrial Injury? Yes    Subjective Complaints:  Date of injury 11/20/2018.  Mechanism of injury- transferring patient from wheelchair to exam table.  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  Patient states that symptoms are mostly resolved.  She has very little pain at this point.  She has attended 2 sessions of physical therapy and has been open.  She has been essentially doing normal duty at this point.   Objective Findings: Spine: No gross deformity.  Full range of motion of cervical and lumbar spine.  Normal gait.   Pre-Existing Condition(s):     Assessment:   Condition Improved    Status: Discharged /  MMI  Permanent Disability:No    Plan:      Diagnostics:      Comments:  Okay to complete remaining approved [facility] therapy  Released from care  Full duty  Follow-up as needed    Disability Information   Status: Released to Full Duty    From:  1/29/2019  Through:   Restrictions are:     Physical Restrictions   Sitting:    Standing:    Stooping:    Bending:      Squatting:    Walking:    Climbing:    Pushing:      Pulling:    Other:    Reaching Above Shoulder (L):   Reaching Above Shoulder (R):       Reaching Below Shoulder (L):    Reaching Below Shoulder (R):      Not to exceed Weight Limits   Carrying(hrs):   Weight  Limit(lb):   Lifting(hrs):   Weight  Limit(lb):     Comments:      Repetitive Actions   Hands: i.e. Fine Manipulations from Grasping:     Feet: i.e. Operating Foot Controls:     Driving / Operate Machinery:     Physician Name: Khari Bean D.O. Physician Signature: KHARI Garcia D.O. e-Signature: Dr. Desmond Hill, Medical Director   Clinic Name / Location: [address],   [address] [place] [id number] Clinic Phone Number: Dept: [phone]   Appointment Time: 4:00 Pm Visit Start Time: 4:48 PM   Check-In Time:  4:45 Pm Visit Discharge Time:  5:01PM   Original-Treating Physician or Chiropractor    Page 2-Insurer/TPA    Page 3-Employer    Page 4-Employee

## 2019-01-30 NOTE — PROGRESS NOTES
"Subjective:      Marcial Gao is a 33 y.o. female who presents with Follow-Up (DOi 11/20/18- back- better )      Date of injury 11/20/2018.  Mechanism of injury- transferring patient from wheelchair to exam table.  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  Patient states that symptoms are mostly resolved.  She has very little pain at this point.  She has attended 2 sessions of physical therapy and has been open.  She has been essentially doing normal duty at this point.     HPI    ROS       Objective:     /80   Pulse 100   Temp 37.4 °C (99.3 °F)   Ht 1.549 m (5' 1\")   Wt 71.2 kg (157 lb)   SpO2 98%   BMI 29.66 kg/m²      Physical Exam    Spine: No gross deformity.  Full range of motion of cervical and lumbar spine.  Normal gait.       Assessment/Plan:     1. Strain of lumbar region, subsequent encounter  2. Cervicalgia    Okay to complete remaining approved Visco therapy  Released from care  Full duty  Follow-up as needed  "

## 2020-04-29 ENCOUNTER — APPOINTMENT (OUTPATIENT)
Dept: MEDICAL GROUP | Facility: PHYSICIAN GROUP | Age: 35
End: 2020-04-29
Payer: COMMERCIAL

## 2020-04-30 ENCOUNTER — APPOINTMENT (OUTPATIENT)
Dept: MEDICAL GROUP | Facility: PHYSICIAN GROUP | Age: 35
End: 2020-04-30
Payer: COMMERCIAL

## 2020-04-30 ENCOUNTER — TELEPHONE (OUTPATIENT)
Dept: MEDICAL GROUP | Facility: PHYSICIAN GROUP | Age: 35
End: 2020-04-30

## 2020-04-30 ENCOUNTER — OFFICE VISIT (OUTPATIENT)
Dept: MEDICAL GROUP | Facility: PHYSICIAN GROUP | Age: 35
End: 2020-04-30
Payer: COMMERCIAL

## 2020-04-30 ENCOUNTER — HOSPITAL ENCOUNTER (OUTPATIENT)
Dept: LAB | Facility: MEDICAL CENTER | Age: 35
End: 2020-04-30
Attending: NURSE PRACTITIONER
Payer: COMMERCIAL

## 2020-04-30 VITALS
RESPIRATION RATE: 16 BRPM | DIASTOLIC BLOOD PRESSURE: 70 MMHG | HEART RATE: 75 BPM | OXYGEN SATURATION: 97 % | WEIGHT: 151.2 LBS | SYSTOLIC BLOOD PRESSURE: 114 MMHG | BODY MASS INDEX: 28.55 KG/M2 | TEMPERATURE: 97.7 F | HEIGHT: 61 IN

## 2020-04-30 DIAGNOSIS — R53.83 FATIGUE, UNSPECIFIED TYPE: ICD-10-CM

## 2020-04-30 DIAGNOSIS — Z00.00 WELLNESS EXAMINATION: ICD-10-CM

## 2020-04-30 DIAGNOSIS — E78.2 MIXED HYPERLIPIDEMIA: ICD-10-CM

## 2020-04-30 DIAGNOSIS — F32.0 CURRENT MILD EPISODE OF MAJOR DEPRESSIVE DISORDER WITHOUT PRIOR EPISODE (HCC): ICD-10-CM

## 2020-04-30 DIAGNOSIS — G43.109 MIGRAINE WITH AURA AND WITHOUT STATUS MIGRAINOSUS, NOT INTRACTABLE: ICD-10-CM

## 2020-04-30 DIAGNOSIS — R53.82 CHRONIC FATIGUE: ICD-10-CM

## 2020-04-30 DIAGNOSIS — F41.9 ANXIETY: ICD-10-CM

## 2020-04-30 LAB
25(OH)D3 SERPL-MCNC: 18 NG/ML (ref 30–100)
ALBUMIN SERPL BCP-MCNC: 3.7 G/DL (ref 3.2–4.9)
ALBUMIN/GLOB SERPL: 1.3 G/DL
ALP SERPL-CCNC: 53 U/L (ref 30–99)
ALT SERPL-CCNC: 19 U/L (ref 2–50)
ANION GAP SERPL CALC-SCNC: 9 MMOL/L (ref 7–16)
AST SERPL-CCNC: 16 U/L (ref 12–45)
BASOPHILS # BLD AUTO: 0.3 % (ref 0–1.8)
BASOPHILS # BLD: 0.04 K/UL (ref 0–0.12)
BILIRUB SERPL-MCNC: 1.2 MG/DL (ref 0.1–1.5)
BUN SERPL-MCNC: 10 MG/DL (ref 8–22)
CALCIUM SERPL-MCNC: 8.9 MG/DL (ref 8.5–10.5)
CHLORIDE SERPL-SCNC: 101 MMOL/L (ref 96–112)
CHOLEST SERPL-MCNC: 257 MG/DL (ref 100–199)
CO2 SERPL-SCNC: 25 MMOL/L (ref 20–33)
CREAT SERPL-MCNC: 0.76 MG/DL (ref 0.5–1.4)
EOSINOPHIL # BLD AUTO: 0.02 K/UL (ref 0–0.51)
EOSINOPHIL NFR BLD: 0.2 % (ref 0–6.9)
ERYTHROCYTE [DISTWIDTH] IN BLOOD BY AUTOMATED COUNT: 49.3 FL (ref 35.9–50)
GLOBULIN SER CALC-MCNC: 2.8 G/DL (ref 1.9–3.5)
GLUCOSE SERPL-MCNC: 94 MG/DL (ref 65–99)
HCT VFR BLD AUTO: 40.2 % (ref 37–47)
HDLC SERPL-MCNC: 76 MG/DL
HGB BLD-MCNC: 13 G/DL (ref 12–16)
IMM GRANULOCYTES # BLD AUTO: 0.12 K/UL (ref 0–0.11)
IMM GRANULOCYTES NFR BLD AUTO: 1 % (ref 0–0.9)
LDLC SERPL CALC-MCNC: 155 MG/DL
LYMPHOCYTES # BLD AUTO: 1.76 K/UL (ref 1–4.8)
LYMPHOCYTES NFR BLD: 15 % (ref 22–41)
MCH RBC QN AUTO: 30.4 PG (ref 27–33)
MCHC RBC AUTO-ENTMCNC: 32.3 G/DL (ref 33.6–35)
MCV RBC AUTO: 94.1 FL (ref 81.4–97.8)
MONOCYTES # BLD AUTO: 0.64 K/UL (ref 0–0.85)
MONOCYTES NFR BLD AUTO: 5.5 % (ref 0–13.4)
NEUTROPHILS # BLD AUTO: 9.13 K/UL (ref 2–7.15)
NEUTROPHILS NFR BLD: 78 % (ref 44–72)
NRBC # BLD AUTO: 0 K/UL
NRBC BLD-RTO: 0 /100 WBC
PLATELET # BLD AUTO: 240 K/UL (ref 164–446)
PMV BLD AUTO: 12.2 FL (ref 9–12.9)
POTASSIUM SERPL-SCNC: 4 MMOL/L (ref 3.6–5.5)
PROT SERPL-MCNC: 6.5 G/DL (ref 6–8.2)
RBC # BLD AUTO: 4.27 M/UL (ref 4.2–5.4)
SODIUM SERPL-SCNC: 135 MMOL/L (ref 135–145)
TRIGL SERPL-MCNC: 129 MG/DL (ref 0–149)
TSH SERPL DL<=0.005 MIU/L-ACNC: 3.13 UIU/ML (ref 0.38–5.33)
VIT B12 SERPL-MCNC: 465 PG/ML (ref 211–911)
WBC # BLD AUTO: 11.7 K/UL (ref 4.8–10.8)

## 2020-04-30 PROCEDURE — 80053 COMPREHEN METABOLIC PANEL: CPT

## 2020-04-30 PROCEDURE — 84443 ASSAY THYROID STIM HORMONE: CPT

## 2020-04-30 PROCEDURE — 80061 LIPID PANEL: CPT

## 2020-04-30 PROCEDURE — 82306 VITAMIN D 25 HYDROXY: CPT

## 2020-04-30 PROCEDURE — 85025 COMPLETE CBC W/AUTO DIFF WBC: CPT

## 2020-04-30 PROCEDURE — 82607 VITAMIN B-12: CPT

## 2020-04-30 PROCEDURE — 36415 COLL VENOUS BLD VENIPUNCTURE: CPT

## 2020-04-30 PROCEDURE — 99214 OFFICE O/P EST MOD 30 MIN: CPT | Performed by: NURSE PRACTITIONER

## 2020-04-30 RX ORDER — SUMATRIPTAN 25 MG/1
25-100 TABLET, FILM COATED ORAL
COMMUNITY
End: 2020-04-30 | Stop reason: SDUPTHER

## 2020-04-30 RX ORDER — SUMATRIPTAN 25 MG/1
25-100 TABLET, FILM COATED ORAL
Qty: 10 TAB | Refills: 11 | Status: SHIPPED | OUTPATIENT
Start: 2020-04-30 | End: 2020-05-30

## 2020-04-30 RX ORDER — DULOXETIN HYDROCHLORIDE 20 MG/1
20 CAPSULE, DELAYED RELEASE ORAL DAILY
Qty: 30 CAP | Refills: 0 | Status: SHIPPED | OUTPATIENT
Start: 2020-04-30 | End: 2020-06-29

## 2020-04-30 ASSESSMENT — ANXIETY QUESTIONNAIRES
1. FEELING NERVOUS, ANXIOUS, OR ON EDGE: MORE THAN HALF THE DAYS
5. BEING SO RESTLESS THAT IT IS HARD TO SIT STILL: SEVERAL DAYS
7. FEELING AFRAID AS IF SOMETHING AWFUL MIGHT HAPPEN: MORE THAN HALF THE DAYS
4. TROUBLE RELAXING: MORE THAN HALF THE DAYS
GAD7 TOTAL SCORE: 14
2. NOT BEING ABLE TO STOP OR CONTROL WORRYING: MORE THAN HALF THE DAYS
3. WORRYING TOO MUCH ABOUT DIFFERENT THINGS: NEARLY EVERY DAY
6. BECOMING EASILY ANNOYED OR IRRITABLE: MORE THAN HALF THE DAYS

## 2020-04-30 ASSESSMENT — PATIENT HEALTH QUESTIONNAIRE - PHQ9
5. POOR APPETITE OR OVEREATING: 1 - SEVERAL DAYS
SUM OF ALL RESPONSES TO PHQ QUESTIONS 1-9: 12
CLINICAL INTERPRETATION OF PHQ2 SCORE: 4

## 2020-04-30 NOTE — ASSESSMENT & PLAN NOTE
This is a new problem.  Patient states that she has been having a lot of depressive symptoms related to being laid off, toxic work environment.    Depression Screening    Little interest or pleasure in doing things?  2 - more than half the days   Feeling down, depressed , or hopeless? 2 - more than half the days   Trouble falling or staying asleep, or sleeping too much?  1 - several days   Feeling tired or having little energy?  2 - more than half the days   Poor appetite or overeating?  1 - several days   Feeling bad about yourself - or that you are a failure or have let yourself or your family down? 1 - several days   Trouble concentrating on things, such as reading the newspaper or watching television? 2 - more than half the days   Moving or speaking so slowly that other people could have noticed.  Or the opposite - being so fidgety or restless that you have been moving around a lot more than usual?  1 - several days   Thoughts that you would be better off dead, or of hurting yourself?  0 - not at all   Patient Health Questionnaire Score: 12       If depressive symptoms identified deferred to follow up visit unless specifically addressed in assesment and plan.    Interpretation of PHQ-9 Total Score   Score Severity   1-4 No Depression   5-9 Mild Depression   10-14 Moderate Depression   15-19 Moderately Severe Depression   20-27 Severe Depression

## 2020-04-30 NOTE — ASSESSMENT & PLAN NOTE
Chronic in nature.  States it has been worse over the last year.  Started a new job, states it has been a toxic environment states she had been threatened, treated poorly in this position and that it was affecting her mental health.  States that she was laid off yesterday as patient states that she did cry a lot after finding out she was laid off but states that he is a new day and feels like her mood is better.  AXEL-7 score is 12.  Patient states that she was on a daily medication multiple years ago and is interested in restarting a daily  medication.

## 2020-04-30 NOTE — PROGRESS NOTES
"Chief Complaint   Patient presents with   • Annual Wellness Visit     Anxiety X 2 months, depression       HISTORY OF PRESENT ILLNESS: Patient is a 34 y.o. female established patient who presents today to this multiple issues.    Anxiety  Chronic in nature.  States it has been worse over the last year.  Started a new job, states it has been a toxic environment states she had been threatened, treated poorly in this position and that it was affecting her mental health.  States that she was laid off yesterday as patient states that she did cry a lot after finding out she was laid off but states that he is a new day and feels like her mood is better.  AXEL-7 score is 12.  Patient states that she was on a daily medication multiple years ago and is interested in restarting a daily  medication.    Migraine with aura and without status migrainosus, not intractable  Chronic in nature.  Stable.  Continues to have throbbing behind her eye, light sensitivity.  Patient will also get severe blurry vision and nausea.  Patient states that she will have these approximately 1 times a month and states that the Imitrex continues to work well for this.  Patient denies any change in her headache pattern.  Dates that she will often sleep in the dark.  Patient states that she will also get pain into her neck and she does use some home remedies.  Has noticed that her migraines are triggered by stress.    Mixed hyperlipidemia  Chronic in nature.  Stable.  Plan at this time is to update labs.  Patient states that diet has been \"okay\".    Chronic fatigue  Chronic in nature.  Patient states that this has continued and has been worse since she has no longer been taking her vitamin D supplement.  States she knows that some of her fatigue is due to poor sleep related to anxiety but states that several of her family members have been diagnosed with thyroid issues recently and she would like to have this checked.    Current mild episode of major " depressive disorder without prior episode (HCC)  This is a new problem.  Patient states that she has been having a lot of depressive symptoms related to being laid off, toxic work environment.    Depression Screening    Little interest or pleasure in doing things?  2 - more than half the days   Feeling down, depressed , or hopeless? 2 - more than half the days   Trouble falling or staying asleep, or sleeping too much?  1 - several days   Feeling tired or having little energy?  2 - more than half the days   Poor appetite or overeating?  1 - several days   Feeling bad about yourself - or that you are a failure or have let yourself or your family down? 1 - several days   Trouble concentrating on things, such as reading the newspaper or watching television? 2 - more than half the days   Moving or speaking so slowly that other people could have noticed.  Or the opposite - being so fidgety or restless that you have been moving around a lot more than usual?  1 - several days   Thoughts that you would be better off dead, or of hurting yourself?  0 - not at all   Patient Health Questionnaire Score: 12       If depressive symptoms identified deferred to follow up visit unless specifically addressed in assesment and plan.    Interpretation of PHQ-9 Total Score   Score Severity   1-4 No Depression   5-9 Mild Depression   10-14 Moderate Depression   15-19 Moderately Severe Depression   20-27 Severe Depression          Patient Active Problem List    Diagnosis Date Noted   • Anxiety 04/30/2020   • Current mild episode of major depressive disorder without prior episode (HCC) 04/30/2020   • Sinusitis 09/12/2018   • Primary osteoarthritis of both wrists 05/25/2018   • Mixed hyperlipidemia 05/25/2018   • Chronic fatigue 05/25/2018   • Migraine with aura and without status migrainosus, not intractable 05/25/2018       Allergies:Latex    Current Outpatient Medications   Medication Sig Dispense Refill   • DULoxetine (CYMBALTA) 20 MG Cap  "DR Particles Take 1 Cap by mouth every day. 30 Cap 0   • SUMAtriptan (IMITREX) 25 MG Tab tablet Take 1-4 Tabs by mouth Once PRN for Migraine for up to 30 days. 10 Tab 11     No current facility-administered medications for this visit.        Social History     Tobacco Use   • Smoking status: Never Smoker   • Smokeless tobacco: Never Used   Substance Use Topics   • Alcohol use: Not Currently     Comment: once a month    • Drug use: No       Family Status   Relation Name Status   • Mo  (Not Specified)   • Sis  (Not Specified)     Family History   Problem Relation Age of Onset   • Psychiatric Illness Mother         depression   • Psychiatric Illness Sister         depression       Review of Systems:   Constitutional:  Negative for fever, chills, weight loss and malaise/fatigue.   HEENT:  Negative for ear pain, nosebleeds, congestion, sore throat and neck pain.    Eyes:  Negative for blurred vision.   Respiratory:  Negative for cough, sputum production, shortness of breath and wheezing.    Cardiovascular:  Negative for chest pain, palpitations, orthopnea and leg swelling.   Gastrointestinal:  Negative for heartburn, nausea, vomiting and abdominal pain.   Genitourinary:  Negative for dysuria, urgency and frequency.   Musculoskeletal:  Negative for myalgias, back pain and joint pain.   Skin:  Negative for rash and itching.   Neurological:  Negative for dizziness, tingling, tremors, sensory change, focal weakness and positive headaches.   Endo/Heme/Allergies:  Does not bruise/bleed easily.   Psychiatric/Behavioral: Positive for depression, negative suicidal ideas and memory loss.  The patient is nervous/anxious and does not have insomnia.    All other systems reviewed and are negative except as in HPI.    Exam:  /70 (BP Location: Right arm, Patient Position: Sitting, BP Cuff Size: Adult)   Pulse 75   Temp 36.5 °C (97.7 °F) (Temporal)   Resp 16   Ht 1.549 m (5' 1\")   Wt 68.6 kg (151 lb 3.2 oz)   SpO2 97% "   Constitutional: Alert, no distress, well-groomed.  Skin: Warm, dry, good turgor, no rashes in visible areas.  Eye: Equal, round and reactive, conjunctiva clear, lids normal.  ENMT: Lips without lesions, good dentition, moist mucous membranes.  Neck: Trachea midline, no masses, no thyromegaly.  Respiratory: Unlabored respiratory effort, no cough.  MSK: Normal gait, moves all extremities.  Neuro: Grossly non-focal.   Psych: Alert and oriented x3, normal affect and mood.    PLAN:    1. Anxiety  Plan to start Cymbalta counseled regarding risk, benefits, side effects.  Patient is requesting tablets.   - DULoxetine (CYMBALTA) 20 MG Cap DR Particles; Take 1 Cap by mouth every day.  Dispense: 30 Cap; Refill: 0    2. Fatigue, unspecified type  - CBC WITH DIFFERENTIAL; Future  - Comp Metabolic Panel; Future  - VITAMIN D,25 HYDROXY; Future  - VITAMIN B12; Future  - TSH WITH REFLEX TO FT4; Future    3. Wellness examination  - CBC WITH DIFFERENTIAL; Future  - Comp Metabolic Panel; Future  - Lipid Profile; Future    4. Current mild episode of major depressive disorder without prior episode (HCC)  As above.  - DULoxetine (CYMBALTA) 20 MG Cap DR Particles; Take 1 Cap by mouth every day.  Dispense: 30 Cap; Refill: 0    5. Migraine with aura and without status migrainosus, not intractable  Continue sumatriptan triptan as it is effective.  - SUMAtriptan (IMITREX) 25 MG Tab tablet; Take 1-4 Tabs by mouth Once PRN for Migraine for up to 30 days.  Dispense: 10 Tab; Refill: 11    6. Mixed hyperlipidemia  We will update labs    7. Chronic fatigue  Update labs as above    Follow-up in 1 week or sooner as needed. Patient is encouraged to be seen in the emergency room for chest pain, palpitations, shortness of breath, dizziness, severe abdominal pain or other concerning symptoms.    Please note that this dictation was created using voice recognition software. I have made every reasonable attempt to correct obvious errors, but I expect that  there are errors of grammar and possibly content that I did not discover before finalizing the note.    Assessment/Plan:  1. Anxiety  DULoxetine (CYMBALTA) 20 MG Cap DR Particles   2. Fatigue, unspecified type  CBC WITH DIFFERENTIAL    Comp Metabolic Panel    VITAMIN D,25 HYDROXY    VITAMIN B12    TSH WITH REFLEX TO FT4   3. Wellness examination  CBC WITH DIFFERENTIAL    Comp Metabolic Panel    Lipid Profile   4. Current mild episode of major depressive disorder without prior episode (HCC)  DULoxetine (CYMBALTA) 20 MG Cap DR Particles   5. Migraine with aura and without status migrainosus, not intractable  SUMAtriptan (IMITREX) 25 MG Tab tablet   6. Mixed hyperlipidemia     7. Chronic fatigue            I have placed the below orders and discussed them with an approved delegating provider. The MA is performing the below orders under the direction of Dr. Mancini.

## 2020-04-30 NOTE — ASSESSMENT & PLAN NOTE
Chronic in nature.  Stable.  Continues to have throbbing behind her eye, light sensitivity.  Patient will also get severe blurry vision and nausea.  Patient states that she will have these approximately 1 times a month and states that the Imitrex continues to work well for this.  Patient denies any change in her headache pattern.  Dates that she will often sleep in the dark.  Patient states that she will also get pain into her neck and she does use some home remedies.  Has noticed that her migraines are triggered by stress.

## 2020-04-30 NOTE — ASSESSMENT & PLAN NOTE
Chronic in nature.  Patient states that this has continued and has been worse since she has no longer been taking her vitamin D supplement.  States she knows that some of her fatigue is due to poor sleep related to anxiety but states that several of her family members have been diagnosed with thyroid issues recently and she would like to have this checked.

## 2020-04-30 NOTE — TELEPHONE ENCOUNTER
Please call patient's pharmacy [facility] in [place] and find out if they carry Cymbalta in tablets.  If they carry Cymbalta and tablets please  change patient's Cymbalta prescription to tablets.

## 2020-04-30 NOTE — ASSESSMENT & PLAN NOTE
"Chronic in nature.  Stable.  Plan at this time is to update labs.  Patient states that diet has been \"okay\".  "

## 2020-04-30 NOTE — TELEPHONE ENCOUNTER
Called [facility]- they do not have the tablets and the patient already picked up the prescription for the caps.

## 2020-05-07 ENCOUNTER — APPOINTMENT (OUTPATIENT)
Dept: MEDICAL GROUP | Facility: PHYSICIAN GROUP | Age: 35
End: 2020-05-07
Payer: COMMERCIAL

## 2020-05-07 ENCOUNTER — OFFICE VISIT (OUTPATIENT)
Dept: MEDICAL GROUP | Facility: PHYSICIAN GROUP | Age: 35
End: 2020-05-07
Payer: COMMERCIAL

## 2020-05-07 VITALS
WEIGHT: 151.8 LBS | DIASTOLIC BLOOD PRESSURE: 76 MMHG | HEART RATE: 88 BPM | RESPIRATION RATE: 16 BRPM | OXYGEN SATURATION: 97 % | SYSTOLIC BLOOD PRESSURE: 114 MMHG | HEIGHT: 61 IN | BODY MASS INDEX: 28.66 KG/M2 | TEMPERATURE: 98 F

## 2020-05-07 DIAGNOSIS — F41.9 ANXIETY: ICD-10-CM

## 2020-05-07 DIAGNOSIS — F32.0 CURRENT MILD EPISODE OF MAJOR DEPRESSIVE DISORDER WITHOUT PRIOR EPISODE (HCC): ICD-10-CM

## 2020-05-07 DIAGNOSIS — E78.2 MIXED HYPERLIPIDEMIA: ICD-10-CM

## 2020-05-07 DIAGNOSIS — G43.109 MIGRAINE WITH AURA AND WITHOUT STATUS MIGRAINOSUS, NOT INTRACTABLE: ICD-10-CM

## 2020-05-07 DIAGNOSIS — E55.9 VITAMIN D DEFICIENCY: ICD-10-CM

## 2020-05-07 PROCEDURE — 99214 OFFICE O/P EST MOD 30 MIN: CPT | Performed by: NURSE PRACTITIONER

## 2020-05-07 RX ORDER — SUMATRIPTAN 50 MG/1
50 TABLET, FILM COATED ORAL
Qty: 10 TAB | Refills: 3 | Status: SHIPPED | OUTPATIENT
Start: 2020-05-07 | End: 2020-06-29 | Stop reason: SDUPTHER

## 2020-05-07 RX ORDER — ONDANSETRON 4 MG/1
4 TABLET, FILM COATED ORAL EVERY 4 HOURS PRN
Qty: 20 TAB | Refills: 0 | Status: SHIPPED | OUTPATIENT
Start: 2020-05-07 | End: 2020-05-22

## 2020-05-07 RX ORDER — BUPROPION HYDROCHLORIDE 75 MG/1
75 TABLET ORAL DAILY
Qty: 30 TAB | Refills: 0 | Status: SHIPPED | OUTPATIENT
Start: 2020-05-07 | End: 2020-06-01

## 2020-05-07 RX ORDER — ERGOCALCIFEROL 1.25 MG/1
50000 CAPSULE ORAL
Qty: 12 CAP | Refills: 0 | Status: SHIPPED | OUTPATIENT
Start: 2020-05-07 | End: 2020-06-29 | Stop reason: SDUPTHER

## 2020-05-07 RX ORDER — KETOROLAC TROMETHAMINE 30 MG/ML
60 INJECTION, SOLUTION INTRAMUSCULAR; INTRAVENOUS ONCE
Status: COMPLETED | OUTPATIENT
Start: 2020-05-07 | End: 2020-05-07

## 2020-05-07 RX ADMIN — KETOROLAC TROMETHAMINE 60 MG: 30 INJECTION, SOLUTION INTRAMUSCULAR; INTRAVENOUS at 13:10

## 2020-05-07 ASSESSMENT — FIBROSIS 4 INDEX: FIB4 SCORE: 0.55

## 2020-05-07 NOTE — PROGRESS NOTES
Chief Complaint   Patient presents with   • Anxiety     med fv   • Migraine       HISTORY OF PRESENT ILLNESS: Patient is a 34 y.o. female established patient who presents today to discuss depression.     Current mild episode of major depressive disorder without prior episode (HCC)  Chronic in nature.  Patient states that she does continue to feel down but states that she has been having side effects with Cymbalta.  Patient states that she has been having a lot of diarrhea, brain fog since she started it as well as an increase in her migraine symptoms.  Patient does state that it helped with feelings of anxiety but did make her very sleepy.  States that when she took it 1 evening to try and combat the fatigue she slept until noon the next day.  Patient states that overall she is feeling better since some of her work situation is settled at this time we are going to consider trying another medication as she is still reporting symptoms of depression but we will have her stop all medications for at least 1 week so that we can determine if her migraines were definitely mediated by the Cymbalta.    Migraine with aura and without status migrainosus, not intractable  Chronic in nature.  Worsening.  Patient states that since starting Cymbalta she has had more frequent severe headaches.  States that she started the Cymbalta Friday of last week took it for 5 days before stopping dates that she had almost daily migraines states she had throbbing mostly behind her right eye, states this morning she was unable to make it to her appointment as her vision was blurry and she would have been unable to drive.  States that she has been having some nausea, sharp pain.  States she has not found anything specific that will alleviate these completely states that she has been taking Imitrex frequently which does decrease her symptoms but does not resolve them.    Mixed hyperlipidemia  Chronic in nature.  Improved at recently completed labs.   Patient will plan to continue healthy diet and exercise.    Anxiety  Chronic in nature.  Patient states symptoms of anxiety have improved somewhat.      Patient Active Problem List    Diagnosis Date Noted   • Anxiety 04/30/2020   • Current mild episode of major depressive disorder without prior episode (HCC) 04/30/2020   • Sinusitis 09/12/2018   • Primary osteoarthritis of both wrists 05/25/2018   • Mixed hyperlipidemia 05/25/2018   • Chronic fatigue 05/25/2018   • Migraine with aura and without status migrainosus, not intractable 05/25/2018       Allergies:Latex    Current Outpatient Medications   Medication Sig Dispense Refill   • SUMAtriptan (IMITREX) 50 MG Tab Take 1 Tab by mouth Once PRN for Migraine for up to 1 dose. 10 Tab 3   • ergocalciferol (DRISDOL) 40094 UNIT capsule Take 1 Cap by mouth every 7 days. 12 Cap 0   • ondansetron (ZOFRAN) 4 MG Tab tablet Take 1 Tab by mouth every four hours as needed for Nausea/Vomiting for up to 15 days. 20 Tab 0   • buPROPion (WELLBUTRIN) 75 MG Tab Take 1 Tab by mouth every day. 30 Tab 0   • DULoxetine (CYMBALTA) 20 MG Cap DR Particles Take 1 Cap by mouth every day. 30 Cap 0   • SUMAtriptan (IMITREX) 25 MG Tab tablet Take 1-4 Tabs by mouth Once PRN for Migraine for up to 30 days. 10 Tab 11     No current facility-administered medications for this visit.        Social History     Tobacco Use   • Smoking status: Never Smoker   • Smokeless tobacco: Never Used   Substance Use Topics   • Alcohol use: Not Currently     Comment: once a month    • Drug use: No       Family Status   Relation Name Status   • Mo  (Not Specified)   • Sis  (Not Specified)     Family History   Problem Relation Age of Onset   • Psychiatric Illness Mother         depression   • Psychiatric Illness Sister         depression       Review of Systems:   Constitutional:  Negative for fever, chills, weight loss and malaise/fatigue.   HEENT:  Negative for ear pain, nosebleeds, congestion, sore throat and neck  "pain.    Eyes:  Negative for blurred vision.   Respiratory:  Negative for cough, sputum production, shortness of breath and wheezing.    Cardiovascular:  Negative for chest pain, palpitations, orthopnea and leg swelling.   Gastrointestinal:  Negative for heartburn, nausea, vomiting and abdominal pain.   Genitourinary:  Negative for dysuria, urgency and frequency.   Musculoskeletal:  Negative for myalgias, back pain and joint pain.   Skin:  Negative for rash and itching.   Neurological:  Negative for dizziness, tingling, tremors, sensory change, focal weakness and headaches.   Endo/Heme/Allergies: Does not bruise/bleed easily.   Psychiatric/Behavioral:  Negative for depression, suicidal ideas and memory loss.  The patient is not nervous/anxious and does not have insomnia.    All other systems reviewed and are negative except as in HPI.    Exam:  /76 (BP Location: Left arm, Patient Position: Sitting)   Pulse 88   Temp 36.7 °C (98 °F) (Temporal)   Resp 16   Ht 1.549 m (5' 1\")   Wt 68.9 kg (151 lb 12.8 oz)   SpO2 97%   General:  Normal appearing. No distress.  Pulmonary:  Clear to ausculation.  Normal effort. No rales, ronchi, or wheezing.  Cardiovascular:  Regular rate and rhythm without murmur. Carotid and radial pulses are intact and equal bilaterally.  Neurologic:  Grossly nonfocal  Lymph:  No cervical, supraclavicular or axillary lymph nodes are palpable  Skin:  Warm and dry.  No obvious lesions.  Musculoskeletal:  Normal gait. No extremity cyanosis, clubbing, or edema.  Psych:  Normal mood and affect. Alert and oriented x3. Judgment and insight is normal.      PLAN:    1. Current mild episode of major depressive disorder without prior episode (HCC)  Stop Cymbalta at this time.  Plan to stop her medications x1 week and then start Wellbutrin.  - buPROPion (WELLBUTRIN) 75 MG Tab; Take 1 Tab by mouth every day.  Dispense: 30 Tab; Refill: 0    2. Migraine with aura and without status migrainosus, not " intractable  Plan at this time to increase dose of Imitrex to 50 mg patient will take this as needed for migraines discussed with patient that if migraines do not cease once Cymbalta is stopped we may consider imaging, a daily medication or follow-up with neurology.  - SUMAtriptan (IMITREX) 50 MG Tab; Take 1 Tab by mouth Once PRN for Migraine for up to 1 dose.  Dispense: 10 Tab; Refill: 3  - ondansetron (ZOFRAN) 4 MG Tab tablet; Take 1 Tab by mouth every four hours as needed for Nausea/Vomiting for up to 15 days.  Dispense: 20 Tab; Refill: 0  - ketorolac (TORADOL) injection 60 mg    3. Anxiety  Continue monitoring  - buPROPion (WELLBUTRIN) 75 MG Tab; Take 1 Tab by mouth every day.  Dispense: 30 Tab; Refill: 0    4. Vitamin D deficiency  Supplement at this time  Plan to recheck patient's B12 as it has been low in the past and she is currently taking a different oral supplement states that she has been feeling much more fatigued since she stopped vitamin B12 injections.  Most recent B12 is within normal limits but is on the lower end of normal as such this provider is concerned she may not maintain her vitamin B12 level on oral supplements.  - ergocalciferol (DRISDOL) 37458 UNIT capsule; Take 1 Cap by mouth every 7 days.  Dispense: 12 Cap; Refill: 0    5. Mixed hyperlipidemia  Improved continue monitoring    Follow-up for Pap smear. Patient is encouraged to be seen in the emergency room for chest pain, palpitations, shortness of breath, dizziness, severe abdominal pain or other concerning symptoms.    Please note that this dictation was created using voice recognition software. I have made every reasonable attempt to correct obvious errors, but I expect that there are errors of grammar and possibly content that I did not discover before finalizing the note.    Assessment/Plan:  1. Current mild episode of major depressive disorder without prior episode (HCC)  buPROPion (WELLBUTRIN) 75 MG Tab   2. Migraine with aura and  without status migrainosus, not intractable  SUMAtriptan (IMITREX) 50 MG Tab    ondansetron (ZOFRAN) 4 MG Tab tablet    ketorolac (TORADOL) injection 60 mg   3. Anxiety  buPROPion (WELLBUTRIN) 75 MG Tab   4. Vitamin D deficiency  ergocalciferol (DRISDOL) 73313 UNIT capsule   5. Mixed hyperlipidemia            I have placed the below orders and discussed them with an approved delegating provider. The MA is performing the below orders under the direction of Dr. Mancini.

## 2020-05-07 NOTE — ASSESSMENT & PLAN NOTE
Chronic in nature.  Worsening.  Patient states that since starting Cymbalta she has had more frequent severe headaches.  States that she started the Cymbalta Friday of last week took it for 5 days before stopping dates that she had almost daily migraines states she had throbbing mostly behind her right eye, states this morning she was unable to make it to her appointment as her vision was blurry and she would have been unable to drive.  States that she has been having some nausea, sharp pain.  States she has not found anything specific that will alleviate these completely states that she has been taking Imitrex frequently which does decrease her symptoms but does not resolve them.

## 2020-05-07 NOTE — ASSESSMENT & PLAN NOTE
Chronic in nature.  Improved at recently completed labs.  Patient will plan to continue healthy diet and exercise.

## 2020-05-18 ENCOUNTER — TELEPHONE (OUTPATIENT)
Dept: MEDICAL GROUP | Facility: PHYSICIAN GROUP | Age: 35
End: 2020-05-18

## 2020-05-18 NOTE — TELEPHONE ENCOUNTER
Phone Number Called: [phone] (home)       Call outcome: Left detailed message for patient. Informed to call back with any additional questions.    Message: LVM for Pt to r/s appt, need to move appt out to middle of June per PCP

## 2020-05-20 ENCOUNTER — APPOINTMENT (OUTPATIENT)
Dept: MEDICAL GROUP | Facility: PHYSICIAN GROUP | Age: 35
End: 2020-05-20
Payer: COMMERCIAL

## 2020-05-23 DIAGNOSIS — F41.9 ANXIETY: ICD-10-CM

## 2020-05-23 DIAGNOSIS — F32.0 CURRENT MILD EPISODE OF MAJOR DEPRESSIVE DISORDER WITHOUT PRIOR EPISODE (HCC): ICD-10-CM

## 2020-05-28 RX ORDER — DULOXETIN HYDROCHLORIDE 20 MG/1
CAPSULE, DELAYED RELEASE ORAL
Qty: 30 CAP | Refills: 0 | OUTPATIENT
Start: 2020-05-28

## 2020-05-30 DIAGNOSIS — F32.0 CURRENT MILD EPISODE OF MAJOR DEPRESSIVE DISORDER WITHOUT PRIOR EPISODE (HCC): ICD-10-CM

## 2020-05-30 DIAGNOSIS — F41.9 ANXIETY: ICD-10-CM

## 2020-06-01 RX ORDER — BUPROPION HYDROCHLORIDE 75 MG/1
TABLET ORAL
Qty: 30 TAB | Refills: 0 | Status: SHIPPED | OUTPATIENT
Start: 2020-06-01 | End: 2020-06-29 | Stop reason: SDUPTHER

## 2020-06-04 DIAGNOSIS — F41.9 ANXIETY: ICD-10-CM

## 2020-06-04 DIAGNOSIS — F32.0 CURRENT MILD EPISODE OF MAJOR DEPRESSIVE DISORDER WITHOUT PRIOR EPISODE (HCC): ICD-10-CM

## 2020-06-04 RX ORDER — DULOXETIN HYDROCHLORIDE 20 MG/1
20 CAPSULE, DELAYED RELEASE ORAL DAILY
Qty: 30 CAP | Refills: 0 | OUTPATIENT
Start: 2020-06-04

## 2020-06-29 ENCOUNTER — TELEMEDICINE (OUTPATIENT)
Dept: MEDICAL GROUP | Facility: PHYSICIAN GROUP | Age: 35
End: 2020-06-29
Payer: COMMERCIAL

## 2020-06-29 VITALS — TEMPERATURE: 98 F | HEIGHT: 61 IN | BODY MASS INDEX: 28.68 KG/M2

## 2020-06-29 DIAGNOSIS — F41.9 ANXIETY: ICD-10-CM

## 2020-06-29 DIAGNOSIS — G43.109 MIGRAINE WITH AURA AND WITHOUT STATUS MIGRAINOSUS, NOT INTRACTABLE: ICD-10-CM

## 2020-06-29 DIAGNOSIS — E55.9 VITAMIN D DEFICIENCY: ICD-10-CM

## 2020-06-29 PROCEDURE — 99214 OFFICE O/P EST MOD 30 MIN: CPT | Mod: 95,CR | Performed by: FAMILY MEDICINE

## 2020-06-29 RX ORDER — ERGOCALCIFEROL 1.25 MG/1
50000 CAPSULE ORAL
Qty: 12 CAP | Refills: 0 | Status: SHIPPED | OUTPATIENT
Start: 2020-06-29 | End: 2020-09-27

## 2020-06-29 RX ORDER — BUPROPION HYDROCHLORIDE 75 MG/1
75 TABLET ORAL 2 TIMES DAILY
Qty: 180 TAB | Refills: 0 | Status: SHIPPED | OUTPATIENT
Start: 2020-06-29 | End: 2020-09-04

## 2020-06-29 RX ORDER — SUMATRIPTAN 25 MG/1
25 TABLET, FILM COATED ORAL
Qty: 10 TAB | Refills: 0 | Status: SHIPPED | OUTPATIENT
Start: 2020-06-29 | End: 2020-12-31 | Stop reason: SDUPTHER

## 2020-06-29 RX ORDER — BUPROPION HYDROCHLORIDE 75 MG/1
75 TABLET ORAL
Qty: 180 TAB | Refills: 0 | Status: SHIPPED | OUTPATIENT
Start: 2020-06-29 | End: 2020-06-29

## 2020-06-29 NOTE — PROGRESS NOTES
Telemedicine Visit: Established Patient     This encounter was conducted via Zoom .   Verbal consent was obtained. Patient's identity was verified.    Subjective:   CC:   Chief Complaint   Patient presents with   • Medication Management   • Requesting Labs       Marcial Gao is a 34 y.o. female presenting for evaluation and management of:    #Vitamin d deficiency  This is a chronic problem  The patient was found to have low vitamin D and was started on vitamin D 50K units every 7 days  She would like to recheck her vitamin d level. It was checked about 2 months ago.     #Anxiety  This is a chronic problem  She was started on Welbutrin titrated up to 75mg BID with good results  This regimen is working for her  She is requesting a refill today    #Migraine  This is a chronic problem  The patient has migraines and usually gets them once every 3 weeks.   Taking Imitrex. Has not taken it together with Wellbutrin      ROS   Denies any recent fevers or chills. No nausea or vomiting. No chest pains or shortness of breath.     Allergies   Allergen Reactions   • Latex Hives       Current medicines (including changes today)  Current Outpatient Medications   Medication Sig Dispense Refill   • ergocalciferol (DRISDOL) 42022 UNIT capsule Take 1 Cap by mouth every 7 days for 90 days. 12 Cap 0   • SUMAtriptan (IMITREX) 25 MG Tab tablet Take 1 Tab by mouth Once PRN for Migraine for up to 1 dose. 10 Tab 0   • buPROPion (WELLBUTRIN) 75 MG Tab Take 1 Tab by mouth 2 times a day for 90 days. 180 Tab 0     No current facility-administered medications for this visit.        Patient Active Problem List    Diagnosis Date Noted   • Anxiety 04/30/2020   • Current mild episode of major depressive disorder without prior episode (HCC) 04/30/2020   • Sinusitis 09/12/2018   • Primary osteoarthritis of both wrists 05/25/2018   • Mixed hyperlipidemia 05/25/2018   • Chronic fatigue 05/25/2018   • Migraine with aura and without status  "migrainosus, not intractable 05/25/2018       Family History   Problem Relation Age of Onset   • Psychiatric Illness Mother         depression   • Psychiatric Illness Sister         depression       She  has a past medical history of Hyperlipidemia, Osteoarthritis, and Vitamin D deficiency disease.  She  has no past surgical history on file.       Objective:   Temp 36.7 °C (98 °F) (Temporal) Comment: pt reported  Ht 1.549 m (5' 1\") Comment: pt reported  BMI 28.68 kg/m²     Physical Exam:  Constitutional: Alert, no distress, well-groomed.  Skin: No rashes in visible areas.  Eye: Round. Conjunctiva clear, lids normal. No icterus.   ENMT: Lips pink without lesions, good dentition, moist mucous membranes. Phonation normal.  Neck: No masses, no thyromegaly. Moves freely without pain.  CV: Pulse as reported by patient  Respiratory: Unlabored respiratory effort, no cough or audible wheeze  Psych: Alert and oriented x3, normal affect and mood.       Assessment and Plan:   The following treatment plan was discussed:     1. Anxiety  This is a chronic condition.  The patient has a history of anxiety currently on Wellbutrin 75 mg twice daily.  This was titrated originally from 75 mg daily.  Overall she feels content with the outcomes of Wellbutrin.  We will continue with current regimen and she will further discuss this medicine with PCP.  Refills authorized today.  - buPROPion (WELLBUTRIN) 75 MG Tab; Take 1 Tab by mouth 2 times a day for 90 days.  Dispense: 180 Tab; Refill: 0    2. Vitamin D deficiency  This is an established condition.  The patient has vitamin D deficiency and currently on vitamin D supplementation.  A refill for medication is authorized today.  I will also place an order for her to recheck her vitamin D level 6 weeks from now.  - ergocalciferol (DRISDOL) 46241 UNIT capsule; Take 1 Cap by mouth every 7 days for 90 days.  Dispense: 12 Cap; Refill: 0  - VITAMIN D,25 HYDROXY; Future    3. Migraine with aura " and without status migrainosus, not intractable  This is a chronic condition.  The patient has a history of migraines controlled with Imitrex 25 to 50 mg as needed.  She only has to use this medication infrequently.  Refill is authorized today.  - SUMAtriptan (IMITREX) 25 MG Tab tablet; Take 1 Tab by mouth Once PRN for Migraine for up to 1 dose.  Dispense: 10 Tab; Refill: 0      Follow-up: Return if symptoms worsen or fail to improve.

## 2020-07-19 ENCOUNTER — PATIENT MESSAGE (OUTPATIENT)
Dept: MEDICAL GROUP | Facility: PHYSICIAN GROUP | Age: 35
End: 2020-07-19

## 2020-07-20 RX ORDER — FLUCONAZOLE 150 MG/1
150 TABLET ORAL DAILY
Qty: 1 TAB | Refills: 0 | Status: SHIPPED | OUTPATIENT
Start: 2020-07-20 | End: 2021-03-25

## 2020-09-04 ENCOUNTER — OFFICE VISIT (OUTPATIENT)
Dept: MEDICAL GROUP | Facility: PHYSICIAN GROUP | Age: 35
End: 2020-09-04
Payer: COMMERCIAL

## 2020-09-04 VITALS
RESPIRATION RATE: 16 BRPM | WEIGHT: 146 LBS | OXYGEN SATURATION: 94 % | TEMPERATURE: 98.3 F | DIASTOLIC BLOOD PRESSURE: 68 MMHG | HEIGHT: 61 IN | BODY MASS INDEX: 27.56 KG/M2 | SYSTOLIC BLOOD PRESSURE: 100 MMHG | HEART RATE: 86 BPM

## 2020-09-04 DIAGNOSIS — N92.6 IRREGULAR PERIODS: ICD-10-CM

## 2020-09-04 DIAGNOSIS — F41.9 ANXIETY: ICD-10-CM

## 2020-09-04 LAB
INT CON NEG: NORMAL
INT CON POS: NORMAL
POC URINE PREGNANCY TEST: NEGATIVE

## 2020-09-04 PROCEDURE — 99214 OFFICE O/P EST MOD 30 MIN: CPT | Performed by: FAMILY MEDICINE

## 2020-09-04 PROCEDURE — 81025 URINE PREGNANCY TEST: CPT | Performed by: FAMILY MEDICINE

## 2020-09-04 RX ORDER — IBUPROFEN 600 MG/1
600 TABLET ORAL DAILY
Qty: 30 TAB | Refills: 0 | Status: SHIPPED | OUTPATIENT
Start: 2020-09-04 | End: 2020-10-16

## 2020-09-04 RX ORDER — BUPROPION HYDROCHLORIDE 100 MG/1
100 TABLET, EXTENDED RELEASE ORAL DAILY
Qty: 90 TAB | Refills: 0 | Status: SHIPPED | OUTPATIENT
Start: 2020-09-04 | End: 2020-12-17

## 2020-09-04 ASSESSMENT — FIBROSIS 4 INDEX: FIB4 SCORE: 0.57

## 2020-09-04 NOTE — PROGRESS NOTES
Subjective:     Chief Complaint   Patient presents with   • Medication Management     wellbutrin, pt thinks this may cause menstrual irregalation, x 6 wks        HPI:   Marcial presents today to discuss the following.  PCP: EUGENIA Thompson    Anxiety  This is a chronic issue  The patient has a hx of this and currently on wellbutrin 75mg BID.   It controls her symptoms really well and she is very content with it  However, she is having irregular menses.     Irregular periods  This is a new problem  Onset: The patient has been experiencing menses over the past 6 weeks  Current symptoms: Daily metrorrhagia.  She is changing tampons  every 2-3 hours.   Modifying factors: She attributes this to possibly the wellbutrin  She is sexually active  Treatments tried: No contraceptive method used.  She is hesitant about this.  Associated symptoms: Denies any.      Past Medical History:   Diagnosis Date   • Hyperlipidemia    • Osteoarthritis    • Vitamin D deficiency disease        Social History     Tobacco Use   • Smoking status: Never Smoker   • Smokeless tobacco: Never Used   Substance Use Topics   • Alcohol use: Not Currently     Comment: once a month    • Drug use: No       Current Outpatient Medications Ordered in Epic   Medication Sig Dispense Refill   • buPROPion SR (WELLBUTRIN-SR) 100 MG TABLET SR 12 HR Take 1 Tab by mouth every day. 90 Tab 0   • ibuprofen (MOTRIN) 600 MG Tab Take 1 Tab by mouth every day. 30 Tab 0   • fluconazole (DIFLUCAN) 150 MG tablet Take 1 Tab by mouth every day. 1 Tab 0   • ergocalciferol (DRISDOL) 15384 UNIT capsule Take 1 Cap by mouth every 7 days for 90 days. 12 Cap 0   • SUMAtriptan (IMITREX) 25 MG Tab tablet Take 1 Tab by mouth Once PRN for Migraine for up to 1 dose. 10 Tab 0     No current Epic-ordered facility-administered medications on file.        Allergies:  Latex    Health Maintenance: Completed    ROS:  Gen: no fevers/chills, no changes in weight  Eyes: no changes in  "vision  ENT: no sore throat, no hearing loss, no bloody nose  Pulm: no sob, no cough  CV: no chest pain, no palpitations  GI: no nausea/vomiting, no diarrhea  : no dysuria      Objective:     Exam:  /68 (BP Location: Right arm, Patient Position: Sitting, BP Cuff Size: Adult)   Pulse 86   Temp 36.8 °C (98.3 °F)   Resp 16   Ht 1.549 m (5' 1\")   Wt 66.2 kg (146 lb)   SpO2 94%   BMI 27.59 kg/m²  Body mass index is 27.59 kg/m².    Constitutional: Alert, no distress, well-groomed.  Skin: Warm, dry, good turgor, no rashes in visible areas.  Eye: Equal, round and reactive, conjunctiva clear, lids normal.  ENMT: Lips without lesions, good dentition, moist mucous membranes.  Neck: Trachea midline, no masses, no thyromegaly.  Respiratory: Unlabored respiratory effort, no cough.  MSK: Normal gait, moves all extremities.  Neuro: Grossly non-focal.   Psych: Alert and oriented x3, normal affect and mood.        Assessment & Plan:     35 y.o. female with the following -     1. Anxiety  This is a chronic, stable condition.  Patient is satisfied with the benefits of Wellbutrin.  She says that she has been taking 100 mg once daily of immediate release.  However, she has developed irregular periods which she attributes to Wellbutrin.  See problem #2.  - buPROPion SR (WELLBUTRIN-SR) 100 MG TABLET SR 12 HR; Take 1 Tab by mouth every day.  Dispense: 90 Tab; Refill: 0    2. Irregular periods  This is a new problem.  The patient has been experiencing irregular periods over the past 6 weeks.  At this time I am not certain if it is Wellbutrin that is causing this.  Urine pregnancy test in the office is negative.  I would like to pursue ultrasonography to further investigate her abnormal uterine bleeding.  She may also try NSAID therapy to help given the fact that she is hesitant about hormonal therapy.  I will also switch her Wellbutrin from 100 mg immediate release daily to sustained-release to see if this makes any " difference.  - POCT PREGNANCY  - US-PELVIC COMPLETE (TRANSABDOMINAL/TRANSVAGINAL) (COMBO); Future  - buPROPion SR (WELLBUTRIN-SR) 100 MG TABLET SR 12 HR; Take 1 Tab by mouth every day.  Dispense: 90 Tab; Refill: 0  - ibuprofen (MOTRIN) 600 MG Tab; Take 1 Tab by mouth every day.  Dispense: 30 Tab; Refill: 0      Return if symptoms worsen or fail to improve.    Please note that this dictation was created using voice recognition software. I have made every reasonable attempt to correct obvious errors, but I expect that there are errors of grammar and possibly content that I did not discover before finalizing the note.

## 2020-09-04 NOTE — ASSESSMENT & PLAN NOTE
This is a chronic issue  The patient has a hx of this and currently on wellbutrin 75mg BID.   It controls her symptoms really well and she is very content with it  However, she is having irregular menses.

## 2020-09-04 NOTE — ASSESSMENT & PLAN NOTE
This is a new problem  Onset: The patient has been experiencing menses over the past 6 weeks  Current symptoms: Daily metrorrhagia.  She is changing tampons  every 2-3 hours.   Modifying factors: She attributes this to possibly the wellbutrin  She is sexually active  Treatments tried: No contraceptive method used.  She is hesitant about this.  Associated symptoms: Denies any.

## 2020-09-27 ENCOUNTER — HOSPITAL ENCOUNTER (OUTPATIENT)
Dept: RADIOLOGY | Facility: MEDICAL CENTER | Age: 35
End: 2020-09-27
Attending: FAMILY MEDICINE
Payer: COMMERCIAL

## 2020-09-27 DIAGNOSIS — N92.6 IRREGULAR PERIODS: ICD-10-CM

## 2020-09-27 PROCEDURE — 76830 TRANSVAGINAL US NON-OB: CPT

## 2020-10-02 ENCOUNTER — TELEMEDICINE (OUTPATIENT)
Dept: MEDICAL GROUP | Facility: PHYSICIAN GROUP | Age: 35
End: 2020-10-02
Payer: COMMERCIAL

## 2020-10-02 VITALS — HEIGHT: 61 IN | WEIGHT: 145 LBS | BODY MASS INDEX: 27.38 KG/M2 | TEMPERATURE: 97.6 F

## 2020-10-02 DIAGNOSIS — R23.2 HOT FLASHES: ICD-10-CM

## 2020-10-02 DIAGNOSIS — N92.6 IRREGULAR PERIODS: ICD-10-CM

## 2020-10-02 DIAGNOSIS — F41.9 ANXIETY: ICD-10-CM

## 2020-10-02 PROCEDURE — 99214 OFFICE O/P EST MOD 30 MIN: CPT | Mod: 95,CR | Performed by: FAMILY MEDICINE

## 2020-10-02 ASSESSMENT — FIBROSIS 4 INDEX: FIB4 SCORE: 0.57

## 2020-10-02 NOTE — PROGRESS NOTES
Virtual Visit: Established Patient   This visit was conducted via Zoom using secure and encrypted videoconferencing technology. The patient was in a private location in the state of [place].    The patient's identity was confirmed and verbal consent was obtained for this virtual visit.    Subjective:   CC:   Chief Complaint   Patient presents with   • Follow-Up       Marcial Gao is a 35 y.o. female presenting for evaluation and management of:    This is an established problem  Onset: The patient endorsed metrorrhagia of 2 months duration on her last evaluation back in August 2020.  This was shortly noticed after she went up on Wellbutrin.  I had recommended changing Wellbutrin to sustained-release 100 mg daily to see if this would make a difference instead of 75 mg immediate release twice daily.  I had also recommended ibuprofen.  Current symptoms: Endorses improvement/resolution of her metrorrhagia.    Modifying factors:  She feels as though the Wellbutrin 100 mg extended release might be too much for her.  She feels like she gets too much energy out of it.  Treatments tried: No contraceptive method used.  She is hesitant about this. I did a trial of ibuprofen which greatly helped.   Associated symptoms: Denies any.    #hot flashes  This is a new problem.  The patient is experiencing hot flashes since her last encounter.  Her periods have stabilized but she is concerned about this new symptom.  Initially she thought that the hot flashes were due to the hot weather but they have persisted in spite of the temperature coming down.      ROS :   Denies any recent fevers or chills. No nausea or vomiting. No chest pains or shortness of breath.     Allergies   Allergen Reactions   • Latex Hives       Current medicines (including changes today)  Current Outpatient Medications   Medication Sig Dispense Refill   • buPROPion SR (WELLBUTRIN-SR) 100 MG TABLET SR 12 HR Take 1 Tab by mouth every day. 90 Tab 0   •  "ibuprofen (MOTRIN) 600 MG Tab Take 1 Tab by mouth every day. 30 Tab 0   • fluconazole (DIFLUCAN) 150 MG tablet Take 1 Tab by mouth every day. 1 Tab 0   • SUMAtriptan (IMITREX) 25 MG Tab tablet Take 1 Tab by mouth Once PRN for Migraine for up to 1 dose. 10 Tab 0     No current facility-administered medications for this visit.        Patient Active Problem List    Diagnosis Date Noted   • Irregular periods 09/04/2020   • Anxiety 04/30/2020   • Current mild episode of major depressive disorder without prior episode (HCC) 04/30/2020   • Sinusitis 09/12/2018   • Primary osteoarthritis of both wrists 05/25/2018   • Mixed hyperlipidemia 05/25/2018   • Chronic fatigue 05/25/2018   • Migraine with aura and without status migrainosus, not intractable 05/25/2018       Family History   Problem Relation Age of Onset   • Psychiatric Illness Mother         depression   • Psychiatric Illness Sister         depression       She  has a past medical history of Hyperlipidemia, Osteoarthritis, and Vitamin D deficiency disease.  She  has no past surgical history on file.       Objective:   Temp 36.4 °C (97.6 °F) (Temporal) Comment: pt reported Comment (Src): pt reported  Ht 1.549 m (5' 1\") Comment: pt reported  Wt 65.8 kg (145 lb) Comment: pt reported  BMI 27.40 kg/m²     Physical Exam:  Constitutional: Alert, no distress, well-groomed.  Skin: No rashes in visible areas.  Eye: Round. Conjunctiva clear, lids normal. No icterus.   ENMT: Lips pink without lesions, good dentition, moist mucous membranes. Phonation normal.  Neck: No masses, no thyromegaly. Moves freely without pain.  Respiratory: Unlabored respiratory effort, no cough or audible wheeze  Psych: Alert and oriented x3, normal affect and mood.       Assessment and Plan:   The following treatment plan was discussed:     1. Anxiety  This is a chronic condition.  The patient is on Wellbutrin 100 mg sustained release once daily.  Patient was initially at 75 mg immediate release " twice daily.  In spite of that, she feels as though this dose is too high for her.  At this time we will do a cautious trial of 1/2 tablet of sustained release daily.  She will cut the tablet in half.  I recommend that she only takes 1/2 tablet/day.  If this is too low of a dose for her then we will have to change Wellbutrin formulation.    2. Irregular periods  This is a chronic, stable condition.  The patient has a history of irregular periods likely attributed to Wellbutrin though this is a rare side effect of this medication.  We will change formulation to sustained-release 100 mg daily which essentially fixed the problem.  She will continue with ibuprofen as needed.  Ultrasound transabdominally and transvaginally was normal.  - REFERRAL TO OB/GYN    3. Hot flashes  This is a new problem.  The patient is now experiencing hot flashes of unknown etiology.  Given the history of irregular periods now in conjunction with hot flashes I would like to get this further evaluated by gynecology.  She already has a gynecology appointment 2 months from now as this is the earliest available.  I will place an urgent referral today in hopes that they can see her sooner.  - REFERRAL TO OB/GYN        Follow-up: Return in about 6 weeks (around 11/13/2020).

## 2020-10-16 DIAGNOSIS — N92.6 IRREGULAR PERIODS: ICD-10-CM

## 2020-10-16 RX ORDER — IBUPROFEN 600 MG/1
TABLET ORAL
Qty: 30 TAB | Refills: 0 | Status: SHIPPED | OUTPATIENT
Start: 2020-10-16 | End: 2021-12-14

## 2020-11-05 ENCOUNTER — TELEPHONE (OUTPATIENT)
Dept: RADIOLOGY | Facility: MEDICAL CENTER | Age: 35
End: 2020-11-05

## 2020-11-07 ENCOUNTER — APPOINTMENT (OUTPATIENT)
Dept: RADIOLOGY | Facility: MEDICAL CENTER | Age: 35
End: 2020-11-07
Attending: OBSTETRICS & GYNECOLOGY
Payer: COMMERCIAL

## 2020-11-10 ENCOUNTER — HOSPITAL ENCOUNTER (OUTPATIENT)
Dept: RADIOLOGY | Facility: MEDICAL CENTER | Age: 35
End: 2020-11-10
Attending: OBSTETRICS & GYNECOLOGY
Payer: COMMERCIAL

## 2020-11-10 DIAGNOSIS — N64.4 BREAST PAIN: ICD-10-CM

## 2020-11-10 DIAGNOSIS — N63.0 BREAST MASS: ICD-10-CM

## 2020-11-10 PROCEDURE — G0279 TOMOSYNTHESIS, MAMMO: HCPCS

## 2020-11-10 PROCEDURE — 76642 ULTRASOUND BREAST LIMITED: CPT | Mod: RT

## 2020-12-16 DIAGNOSIS — N92.6 IRREGULAR PERIODS: ICD-10-CM

## 2020-12-16 DIAGNOSIS — F41.9 ANXIETY: ICD-10-CM

## 2020-12-17 RX ORDER — BUPROPION HYDROCHLORIDE 100 MG/1
TABLET, EXTENDED RELEASE ORAL
Qty: 90 TAB | Refills: 0 | Status: SHIPPED | OUTPATIENT
Start: 2020-12-17 | End: 2020-12-18

## 2020-12-18 ENCOUNTER — TELEMEDICINE (OUTPATIENT)
Dept: MEDICAL GROUP | Facility: PHYSICIAN GROUP | Age: 35
End: 2020-12-18
Payer: COMMERCIAL

## 2020-12-18 VITALS — BODY MASS INDEX: 27.38 KG/M2 | HEIGHT: 61 IN | WEIGHT: 145 LBS

## 2020-12-18 DIAGNOSIS — N92.6 IRREGULAR PERIODS: ICD-10-CM

## 2020-12-18 DIAGNOSIS — L65.9 HAIR LOSS: ICD-10-CM

## 2020-12-18 PROCEDURE — 99214 OFFICE O/P EST MOD 30 MIN: CPT | Mod: 95,CR | Performed by: FAMILY MEDICINE

## 2020-12-18 RX ORDER — BUPROPION HYDROCHLORIDE 75 MG/1
75 TABLET ORAL DAILY
Qty: 90 TAB | Refills: 0 | Status: SHIPPED | OUTPATIENT
Start: 2020-12-18 | End: 2021-03-25

## 2020-12-18 ASSESSMENT — FIBROSIS 4 INDEX: FIB4 SCORE: 0.57

## 2020-12-18 NOTE — PROGRESS NOTES
Virtual Visit: Established Patient   This visit was conducted via Zoom using secure and encrypted videoconferencing technology. The patient was in a private location in the state of [place].    The patient's identity was confirmed and verbal consent was obtained for this virtual visit.    Subjective:   CC:   Chief Complaint   Patient presents with   • Medication Management       Marcial Gao is a 35 y.o. female presenting for evaluation and management of:    #hairloss  This is a new condition     Symptom onset: 2 months ago   Current symptoms: hair loss  Since onset symptoms are: Unchanged  Modifying factors: is taking Wellbutrin and has hair loss as a potential side effect.   Treatments tried: None   Associated symptoms: Denies any     #irregular periods  This is an established problem  Onset: The patient endorsed metrorrhagia of 4 months duration on her last evaluation back in October 2020.  I had recommended ibuprofen given the fact that she is hesitant about contraceptives.  Current symptoms: still endorses metrorrhagia. Has an appt with Dr Gerber with gynecology next week.  Modifying factors: due to establish with GYN with Dr Gerber   Treatments tried: No contraceptive method used.  She is hesitant about this. I did a trial of ibuprofen which greatly helped.   Associated symptoms: Endorses hot flashes on and off.    ROS   Denies any recent fevers or chills. No nausea or vomiting. No chest pains or shortness of breath.     Allergies   Allergen Reactions   • Latex Hives       Current medicines (including changes today)  Current Outpatient Medications   Medication Sig Dispense Refill   • buPROPion (WELLBUTRIN) 75 MG Tab Take 1 Tab by mouth every day. 90 Tab 0   • ibuprofen (MOTRIN) 600 MG Tab TAKE 1 TABLET BY MOUTH EVERY DAY 30 Tab 0   • fluconazole (DIFLUCAN) 150 MG tablet Take 1 Tab by mouth every day. 1 Tab 0   • SUMAtriptan (IMITREX) 25 MG Tab tablet Take 1 Tab by mouth Once PRN for Migraine for up  "to 1 dose. 10 Tab 0     No current facility-administered medications for this visit.        Patient Active Problem List    Diagnosis Date Noted   • Irregular periods 09/04/2020   • Anxiety 04/30/2020   • Current mild episode of major depressive disorder without prior episode (HCC) 04/30/2020   • Sinusitis 09/12/2018   • Primary osteoarthritis of both wrists 05/25/2018   • Mixed hyperlipidemia 05/25/2018   • Chronic fatigue 05/25/2018   • Migraine with aura and without status migrainosus, not intractable 05/25/2018       Family History   Problem Relation Age of Onset   • Psychiatric Illness Mother         depression   • Psychiatric Illness Sister         depression       She  has a past medical history of Hyperlipidemia, Osteoarthritis, and Vitamin D deficiency disease.  She  has no past surgical history on file.       Objective:   Ht 1.549 m (5' 1\") Comment: pt reported  Wt 65.8 kg (145 lb) Comment: pt reported  BMI 27.40 kg/m²     Physical Exam:  Constitutional: Alert, no distress, well-groomed.  Skin: No rashes in visible areas.  Eye: Round. Conjunctiva clear, lids normal. No icterus.   ENMT: Lips pink without lesions, good dentition, moist mucous membranes. Phonation normal.  Neck: No masses, no thyromegaly. Moves freely without pain.  Respiratory: Unlabored respiratory effort, no cough or audible wheeze  Psych: Alert and oriented x3, normal affect and mood.       Assessment and Plan:   The following treatment plan was discussed:     1. Hair loss  This is a new problem.  The patient has been experiencing increased hair loss over the past couple of months of unknown etiology.  Last TSH checked 7 months ago was normal.  Upon further investigation she is taking Wellbutrin which though rarely can cause hair loss.  At this time we will go down on Wellbutrin from 100 mg daily to 75 mg daily to see if this makes any difference.  Unfortunately, switching to a different SSRI would still pose the same risk of hair loss.  " I will also be referring to dermatology for further evaluation.  She would like to hold off on repeat lab testing at this time.  - REFERRAL TO DERMATOLOGY    2. Irregular periods  This is a chronic, stable condition.  She continues to experience irregular periods on and off controlled with ibuprofen.  She continues to be hesitant about contraceptive use at this time.  She is due to establish with gynecology next week.  I suspect that she may a hormonal imbalance given the fact that she is also experiencing hot flashes.  I recommend that she follows up with PCP about these ongoing issues on 12/31/2020.  -Await gynecology's further recommendations.    Other orders  - buPROPion (WELLBUTRIN) 75 MG Tab; Take 1 Tab by mouth every day.  Dispense: 90 Tab; Refill: 0        Follow-up: Return if symptoms worsen or fail to improve.

## 2020-12-31 ENCOUNTER — TELEMEDICINE (OUTPATIENT)
Dept: MEDICAL GROUP | Facility: PHYSICIAN GROUP | Age: 35
End: 2020-12-31
Payer: COMMERCIAL

## 2020-12-31 VITALS — BODY MASS INDEX: 26.81 KG/M2 | HEIGHT: 61 IN | WEIGHT: 142 LBS | RESPIRATION RATE: 16 BRPM

## 2020-12-31 DIAGNOSIS — F32.0 CURRENT MILD EPISODE OF MAJOR DEPRESSIVE DISORDER WITHOUT PRIOR EPISODE (HCC): ICD-10-CM

## 2020-12-31 DIAGNOSIS — L65.9 HAIR LOSS: ICD-10-CM

## 2020-12-31 DIAGNOSIS — F41.9 ANXIETY: ICD-10-CM

## 2020-12-31 DIAGNOSIS — G43.109 MIGRAINE WITH AURA AND WITHOUT STATUS MIGRAINOSUS, NOT INTRACTABLE: ICD-10-CM

## 2020-12-31 DIAGNOSIS — N92.6 IRREGULAR PERIODS: ICD-10-CM

## 2020-12-31 PROCEDURE — 99214 OFFICE O/P EST MOD 30 MIN: CPT | Mod: 95,CR | Performed by: NURSE PRACTITIONER

## 2020-12-31 RX ORDER — SUMATRIPTAN 50 MG/1
50 TABLET, FILM COATED ORAL
Qty: 10 TAB | Refills: 0 | Status: SHIPPED | OUTPATIENT
Start: 2020-12-31 | End: 2021-03-25 | Stop reason: SDUPTHER

## 2020-12-31 ASSESSMENT — FIBROSIS 4 INDEX: FIB4 SCORE: 0.57

## 2020-12-31 NOTE — ASSESSMENT & PLAN NOTE
Chronic in nature per OB/GYN yareli-menopausal symptoms.  Patient states she has some concern that she was getting steroid injections for her back pain relief frequently up until May when he stopped.  States that OB/GYN declined to check hormone levels.  And was not concerned with the irregularity of her period.  She was told that they were more concerned when she was bleeding for 6 months but stated that.  Irregularities can be normal at her age.  We'll continue to monitor.

## 2020-12-31 NOTE — ASSESSMENT & PLAN NOTE
This is a new issue.  Started in September/October.  States that hair has been thinning quickly and more significantly.  Patient states that her family members and her hairdresser have noticed.  States that when she saw OB/GYN they stated they would not be concerned unless when she puts her hand up to her hair and pulls it just falls out in clumps.  She is not having any bald spots which is another question that the OB/GYN asked.  States that overall she is concerned about this and is frustrated by the lack of concern at OB/GYN.  Patient does have an appointment with dermatology.

## 2020-12-31 NOTE — PROGRESS NOTES
Virtual Visit: Established Patient   This visit was conducted via Zoom using secure and encrypted videoconferencing technology. The patient was in a private location in the state of [place].    The patient's identity was confirmed and verbal consent was obtained for this virtual visit.    Subjective:   CC:   Chief Complaint   Patient presents with   • Follow-Up     Hair loss, medications, and Lab work        Marcial Gao is a 35 y.o. female presenting for evaluation and management of:    Current mild episode of major depressive disorder without prior episode (HCC)  Chronic in nature. Stable.  States that Wellbutrin is working excellently.  Patient states that she feels well she feels happy she feels stable.  Patient states that the increase in mental stability has been huge and has changed her life overall.  States medication is overall going well.  States that her only concern is hair loss.  States that the hair loss started in September.  States [facility] to the [facility] loss as well and has some menstrual irregularities which will be discussed further below.  This provider told her that her labs were completely normal did not check hormones, but stated that her hair loss was likely related to age and stressed this is a rare side effect of Wellbutrin so there is some concern that it could be medication related.  Patient does have a referral into dermatology and will be seeing dermatology soon.  Considering that medication works very well for depression and anxiety plan at this time is to follow other avenues of possible etiology for hair loss prior to considering switching this medication.    Irregular periods  Chronic in nature per OB/GYN yareli-menopausal symptoms.  Patient states she has some concern that she was getting steroid injections for her back pain relief frequently up until May when he stopped.  States that OB/GYN declined to check hormone levels.  And was  not concerned with the irregularity of her period.  She was told that they were more concerned when she was bleeding for 6 months but stated that.  Irregularities can be normal at her age.  We'll continue to monitor.    Hair loss  This is a new issue.  Started in September/October.  States that hair has been thinning quickly and more significantly.  Patient states that her family members and her hairdresser have noticed.  States that when she saw OB/GYN they stated they would not be concerned unless when she puts her hand up to her hair and pulls it just falls out in clumps.  She is not having any bald spots which is another question that the OB/GYN asked.  States that overall she is concerned about this and is frustrated by the lack of concern at OB/GYN.  Patient does have an appointment with dermatology.    Anxiety  cymbalta for sister hair loss too. Biotin, vitamin D, fish oil.    Migraine with aura and without status migrainosus, not intractable  Chronic in nature.  Stable.  Imitrex works well for this issue and patient is requesting a refill today.  She states that headache frequency has been stable that she has not had any severe symptoms recently.        ROS   Denies any recent fevers or chills. No nausea or vomiting. No chest pains or shortness of breath.     Allergies   Allergen Reactions   • Latex Hives       Current medicines (including changes today)  Current Outpatient Medications   Medication Sig Dispense Refill   • SUMAtriptan (IMITREX) 50 MG Tab Take 1 Tab by mouth one time as needed for Migraine for up to 1 dose. 10 Tab 0   • buPROPion (WELLBUTRIN) 75 MG Tab Take 1 Tab by mouth every day. 90 Tab 0   • ibuprofen (MOTRIN) 600 MG Tab TAKE 1 TABLET BY MOUTH EVERY DAY 30 Tab 0   • fluconazole (DIFLUCAN) 150 MG tablet Take 1 Tab by mouth every day. 1 Tab 0     No current facility-administered medications for this visit.        Patient Active Problem List    Diagnosis Date Noted   • Hair loss 12/31/2020  "  • Irregular periods 09/04/2020   • Anxiety 04/30/2020   • Current mild episode of major depressive disorder without prior episode (HCC) 04/30/2020   • Sinusitis 09/12/2018   • Primary osteoarthritis of both wrists 05/25/2018   • Mixed hyperlipidemia 05/25/2018   • Chronic fatigue 05/25/2018   • Migraine with aura and without status migrainosus, not intractable 05/25/2018       Family History   Problem Relation Age of Onset   • Psychiatric Illness Mother         depression   • Psychiatric Illness Sister         depression       She  has a past medical history of Hyperlipidemia, Osteoarthritis, and Vitamin D deficiency disease.  She  has no past surgical history on file.       Objective:   Resp 16   Ht 1.549 m (5' 1\") Comment: Pt reported  Wt 64.4 kg (142 lb) Comment: Pt reported  BMI 26.83 kg/m²     Physical Exam:  Constitutional: Alert, no distress, well-groomed.  Skin: No rashes in visible areas.  Eye: Round. Conjunctiva clear, lids normal. No icterus.   ENMT: Lips pink without lesions, good dentition, moist mucous membranes. Phonation normal.  Neck: No masses, no thyromegaly. Moves freely without pain.  Respiratory: Unlabored respiratory effort, no cough or audible wheeze  Psych: Alert and oriented x3, normal affect and mood.       Assessment and Plan:   The following treatment plan was discussed:     1. Current mild episode of major depressive disorder without prior episode (HCC)    2. Irregular periods    3. Hair loss    4. Anxiety    5. Migraine with aura and without status migrainosus, not intractable  - SUMAtriptan (IMITREX) 50 MG Tab; Take 1 Tab by mouth one time as needed for Migraine for up to 1 dose.  Dispense: 10 Tab; Refill: 0    With regard to hair loss, anxiety, irregular menses.  Patient will schedule follow-up with OB/GYN to discuss menses further, with regard to hair loss patient will see dermatology.  We'll refill Imitrex for migraines.  Anxiety and depression are being well controlled with " Wellbutrin but as there is some concern for hair loss as a side effect we may consider changing this after follow-up with dermatology.  Rogaine is discussed in depth.  Patient is taking vitamins to assist with this issue.  Also discussed hair cycles which she will discuss in further depth with dermatology as we may see rebound in the next few months.    Patient will follow up in 3 months.     Follow-up: Return if symptoms worsen or fail to improve, for Lab Review.

## 2020-12-31 NOTE — ASSESSMENT & PLAN NOTE
Chronic in nature. Stable.  States that Wellbutrin is working excellently.  Patient states that she feels well she feels happy she feels stable.  Patient states that the increase in mental stability has been huge and has changed her life overall.  States medication is overall going well.  States that her only concern is hair loss.  States that the hair loss started in September.  States that [facility] to the [facility] loss as well and has some menstrual irregularities which will be discussed further below.  This provider told her that her labs were completely normal did not check hormones, but stated that her hair loss was likely related to age and stressed this is a rare side effect of Wellbutrin so there is some concern that it could be medication related.  Patient does have a referral into dermatology and will be seeing dermatology soon.  Considering that medication works very well for depression and anxiety plan at this time is to follow other avenues of possible etiology for hair loss prior to considering switching this medication.

## 2020-12-31 NOTE — ASSESSMENT & PLAN NOTE
Chronic in nature.  Stable.  Imitrex works well for this issue and patient is requesting a refill today.  She states that headache frequency has been stable that she has not had any severe symptoms recently.

## 2021-01-06 ENCOUNTER — PATIENT MESSAGE (OUTPATIENT)
Dept: MEDICAL GROUP | Facility: PHYSICIAN GROUP | Age: 36
End: 2021-01-06

## 2021-01-06 DIAGNOSIS — E55.9 VITAMIN D DEFICIENCY: ICD-10-CM

## 2021-01-07 RX ORDER — ERGOCALCIFEROL 1.25 MG/1
50000 CAPSULE ORAL
Qty: 12 CAP | Refills: 3 | Status: SHIPPED | OUTPATIENT
Start: 2021-01-07 | End: 2021-03-25 | Stop reason: SDUPTHER

## 2021-03-25 ENCOUNTER — TELEMEDICINE (OUTPATIENT)
Dept: MEDICAL GROUP | Facility: PHYSICIAN GROUP | Age: 36
End: 2021-03-25
Payer: COMMERCIAL

## 2021-03-25 VITALS — HEIGHT: 61 IN | RESPIRATION RATE: 18 BRPM | WEIGHT: 142 LBS | BODY MASS INDEX: 26.81 KG/M2

## 2021-03-25 DIAGNOSIS — Z00.00 WELLNESS EXAMINATION: ICD-10-CM

## 2021-03-25 DIAGNOSIS — F32.0 CURRENT MILD EPISODE OF MAJOR DEPRESSIVE DISORDER WITHOUT PRIOR EPISODE (HCC): ICD-10-CM

## 2021-03-25 DIAGNOSIS — G43.109 MIGRAINE WITH AURA AND WITHOUT STATUS MIGRAINOSUS, NOT INTRACTABLE: ICD-10-CM

## 2021-03-25 DIAGNOSIS — L65.9 HAIR LOSS: ICD-10-CM

## 2021-03-25 DIAGNOSIS — F41.9 ANXIETY: ICD-10-CM

## 2021-03-25 DIAGNOSIS — E55.9 VITAMIN D DEFICIENCY: ICD-10-CM

## 2021-03-25 DIAGNOSIS — R53.83 FATIGUE, UNSPECIFIED TYPE: ICD-10-CM

## 2021-03-25 PROCEDURE — 99214 OFFICE O/P EST MOD 30 MIN: CPT | Mod: 95,CR | Performed by: NURSE PRACTITIONER

## 2021-03-25 RX ORDER — SPIRONOLACTONE 25 MG/1
25 TABLET ORAL DAILY
COMMUNITY
End: 2021-12-14

## 2021-03-25 RX ORDER — BUPROPION HYDROCHLORIDE 100 MG/1
100 TABLET, EXTENDED RELEASE ORAL
Qty: 90 TABLET | Refills: 3 | Status: SHIPPED | OUTPATIENT
Start: 2021-03-25 | End: 2021-12-14

## 2021-03-25 RX ORDER — ERGOCALCIFEROL 1.25 MG/1
50000 CAPSULE ORAL
Qty: 12 CAPSULE | Refills: 3 | Status: SHIPPED | OUTPATIENT
Start: 2021-03-25 | End: 2022-03-15 | Stop reason: SDUPTHER

## 2021-03-25 RX ORDER — SUMATRIPTAN 50 MG/1
50 TABLET, FILM COATED ORAL
Qty: 10 TABLET | Refills: 3 | Status: SHIPPED | OUTPATIENT
Start: 2021-03-25 | End: 2022-03-15 | Stop reason: SDUPTHER

## 2021-03-25 ASSESSMENT — FIBROSIS 4 INDEX: FIB4 SCORE: 0.57

## 2021-03-25 NOTE — ASSESSMENT & PLAN NOTE
Chronic in nature. States that she is taking spironolactone which is helping with hair growth.  Biopsy through dermatology was negative.  Dermatologist may also start her on finasteride, but patient and dermatologist agreed to do one medication change at a time so that she would be able to tell what were side effects from changing medication.

## 2021-03-25 NOTE — ASSESSMENT & PLAN NOTE
Chronic in nature.  Patient states that she is restarted vitamin D supplementation and states that she has felt much better since restarting medication.  States that the change includes that it improved her mood and decreased her irritability.

## 2021-03-25 NOTE — ASSESSMENT & PLAN NOTE
Chronic in nature.  Stable at this time.  She states that Imitrex continues to work well and she would like a refill today.  No noted change in migraine symptoms.

## 2021-03-25 NOTE — PROGRESS NOTES
Virtual Visit: Established Patient   This visit was conducted via Zoom using secure and encrypted videoconferencing technology. The patient was in a private location in the state of [place].    The patient's identity was confirmed and verbal consent was obtained for this virtual visit.    Subjective:   CC:   Chief Complaint   Patient presents with   • Medication Management     going back to wellbutrin XL, and refill on sumatriptan, and vitamin D       Marcial Gao is a 35 y.o. female presenting for evaluation and management of:    Hair loss  Chronic in nature. States that she is taking spironolactone which is helping with hair growth.  Biopsy through dermatology was negative.  Dermatologist may also start her on finasteride, but patient and dermatologist agreed to do one medication change at a time so that she would be able to tell what were side effects from changing medication.    Migraine with aura and without status migrainosus, not intractable  Chronic in nature.  Stable at this time.  She states that Imitrex continues to work well and she would like a refill today.  No noted change in migraine symptoms.    Anxiety  Chronic in nature.  States that when she was on the Wellbutrin, not extended release, she was noticing increased irritability, increased fatigue.  Does not state specifically that anxiety was worse but some concern related to aforementioned symptoms.    Current mild episode of major depressive disorder without prior episode (HCC)  Chronic in nature.  Stable.  Patient states that on extend Release Wellbutrin she felt well and stated that she felt it lasted the entire day.  With the nonextended release formula she was noticing a lot of fatigue and irritability she did not feel like this medication was as effective.  She is interested in returning to the extended release formula considering its significantly better effect on her symptoms.  She denies suicidal or homicidal  "ideation.    Vitamin D deficiency  Chronic in nature.  Patient states that she is restarted vitamin D supplementation and states that she has felt much better since restarting medication.  States that the change includes that it improved her mood and decreased her irritability.        ROS   Denies any recent fevers or chills. No nausea or vomiting. No chest pains or shortness of breath.     Allergies   Allergen Reactions   • Latex Hives       Current medicines (including changes today)  Current Outpatient Medications   Medication Sig Dispense Refill   • spironolactone (ALDACTONE) 25 MG Tab Take 25 mg by mouth every day.     • buPROPion SR (WELLBUTRIN-SR) 100 MG TABLET SR 12 HR Take 1 tablet by mouth every day. 90 tablet 3   • SUMAtriptan (IMITREX) 50 MG Tab Take 1 tablet by mouth one time as needed for Migraine for up to 1 dose. 10 tablet 3   • ergocalciferol (DRISDOL) 52089 UNIT capsule Take 1 capsule by mouth every 7 days. 12 capsule 3   • ibuprofen (MOTRIN) 600 MG Tab TAKE 1 TABLET BY MOUTH EVERY DAY 30 Tab 0     No current facility-administered medications for this visit.       Patient Active Problem List    Diagnosis Date Noted   • Vitamin D deficiency 03/25/2021   • Hair loss 12/31/2020   • Irregular periods 09/04/2020   • Anxiety 04/30/2020   • Current mild episode of major depressive disorder without prior episode (HCC) 04/30/2020   • Sinusitis 09/12/2018   • Primary osteoarthritis of both wrists 05/25/2018   • Mixed hyperlipidemia 05/25/2018   • Chronic fatigue 05/25/2018   • Migraine with aura and without status migrainosus, not intractable 05/25/2018       Family History   Problem Relation Age of Onset   • Psychiatric Illness Mother         depression   • Psychiatric Illness Sister         depression       She  has a past medical history of Hyperlipidemia, Osteoarthritis, and Vitamin D deficiency disease.  She  has no past surgical history on file.       Objective:   Resp 18   Ht 1.549 m (5' 1\") Comment: " Pt reported  Wt 64.4 kg (142 lb) Comment: Pt reported  BMI 26.83 kg/m²     Physical Exam:  Constitutional: Alert, no distress, well-groomed.  Skin: No rashes in visible areas.  Eye: Round. Conjunctiva clear, lids normal. No icterus.   ENMT: Lips pink without lesions, good dentition, moist mucous membranes. Phonation normal.  Neck: No masses, no thyromegaly. Moves freely without pain.  Respiratory: Unlabored respiratory effort, no cough or audible wheeze  Psych: Alert and oriented x3, normal affect and mood.       Assessment and Plan:   The following treatment plan was discussed:     1. Hair loss  Continue follow-up with dermatology and current medications  Plan to update labs related to possible electrolyte changes with spironolactone      2. Migraine with aura and without status migrainosus, not intractable  Continue current medication  - SUMAtriptan (IMITREX) 50 MG Tab; Take 1 tablet by mouth one time as needed for Migraine for up to 1 dose.  Dispense: 10 tablet; Refill: 3    3. Anxiety  Decision was made to switch from Wellbutrin to Wellbutrin SR today patient counseled regarding risk, benefits, side effects.  - buPROPion SR (WELLBUTRIN-SR) 100 MG TABLET SR 12 HR; Take 1 tablet by mouth every day.  Dispense: 90 tablet; Refill: 3    5. Vitamin D deficiency  Continue vitamin D supplementation  - ergocalciferol (DRISDOL) 89373 UNIT capsule; Take 1 capsule by mouth every 7 days.  Dispense: 12 capsule; Refill: 3  - VITAMIN D,25 HYDROXY; Future    6. Wellness examination  - CBC WITH DIFFERENTIAL; Future  - Comp Metabolic Panel; Future  - Lipid Profile; Future    7. Fatigue, unspecified type  Patient has issues with chronic fatigue as well as a strong family history of all of her sisters and her mother having thyroid disease including her sister being diagnosed with Hashimoto's thyroiditis.  - TSH; Future    8. Current mild episode of major depressive disorder without prior episode (HCC)  Continue current  medication.        Follow-up: Return in about 2 months (around 5/25/2021) for In office/lab review.

## 2021-03-25 NOTE — ASSESSMENT & PLAN NOTE
Chronic in nature.  Stable.  Patient states that on extend Release Wellbutrin she felt well and stated that she felt it lasted the entire day.  With the nonextended release formula she was noticing a lot of fatigue and irritability she did not feel like this medication was as effective.  She is interested in returning to the extended release formula considering its significantly better effect on her symptoms.  She denies suicidal or homicidal ideation.

## 2021-03-25 NOTE — ASSESSMENT & PLAN NOTE
Chronic in nature.  States that when she was on the Wellbutrin, not extended release, she was noticing increased irritability, increased fatigue.  Does not state specifically that anxiety was worse but some concern related to aforementioned symptoms.

## 2021-06-08 ENCOUNTER — APPOINTMENT (OUTPATIENT)
Dept: MEDICAL GROUP | Facility: PHYSICIAN GROUP | Age: 36
End: 2021-06-08
Payer: COMMERCIAL

## 2021-12-14 ENCOUNTER — HOSPITAL ENCOUNTER (OUTPATIENT)
Dept: LAB | Facility: MEDICAL CENTER | Age: 36
End: 2021-12-14
Attending: NURSE PRACTITIONER
Payer: COMMERCIAL

## 2021-12-14 ENCOUNTER — OFFICE VISIT (OUTPATIENT)
Dept: MEDICAL GROUP | Facility: PHYSICIAN GROUP | Age: 36
End: 2021-12-14
Payer: COMMERCIAL

## 2021-12-14 VITALS
DIASTOLIC BLOOD PRESSURE: 66 MMHG | WEIGHT: 152.4 LBS | SYSTOLIC BLOOD PRESSURE: 96 MMHG | BODY MASS INDEX: 28.77 KG/M2 | HEART RATE: 80 BPM | RESPIRATION RATE: 20 BRPM | OXYGEN SATURATION: 96 % | TEMPERATURE: 97.7 F | HEIGHT: 61 IN

## 2021-12-14 DIAGNOSIS — E55.9 VITAMIN D DEFICIENCY: ICD-10-CM

## 2021-12-14 DIAGNOSIS — M19.032 PRIMARY OSTEOARTHRITIS OF BOTH WRISTS: ICD-10-CM

## 2021-12-14 DIAGNOSIS — F41.9 ANXIETY: ICD-10-CM

## 2021-12-14 DIAGNOSIS — F32.0 CURRENT MILD EPISODE OF MAJOR DEPRESSIVE DISORDER WITHOUT PRIOR EPISODE (HCC): ICD-10-CM

## 2021-12-14 DIAGNOSIS — Z00.00 WELLNESS EXAMINATION: ICD-10-CM

## 2021-12-14 DIAGNOSIS — R53.83 FATIGUE, UNSPECIFIED TYPE: ICD-10-CM

## 2021-12-14 DIAGNOSIS — L65.9 HAIR LOSS: ICD-10-CM

## 2021-12-14 DIAGNOSIS — M19.031 PRIMARY OSTEOARTHRITIS OF BOTH WRISTS: ICD-10-CM

## 2021-12-14 DIAGNOSIS — Z23 NEED FOR VACCINATION: ICD-10-CM

## 2021-12-14 LAB
ALBUMIN SERPL BCP-MCNC: 4.2 G/DL (ref 3.2–4.9)
ALBUMIN/GLOB SERPL: 1.5 G/DL
ALP SERPL-CCNC: 85 U/L (ref 30–99)
ALT SERPL-CCNC: 11 U/L (ref 2–50)
ANION GAP SERPL CALC-SCNC: 11 MMOL/L (ref 7–16)
AST SERPL-CCNC: 8 U/L (ref 12–45)
BASOPHILS # BLD AUTO: 0.5 % (ref 0–1.8)
BASOPHILS # BLD: 0.04 K/UL (ref 0–0.12)
BILIRUB SERPL-MCNC: 0.3 MG/DL (ref 0.1–1.5)
BUN SERPL-MCNC: 16 MG/DL (ref 8–22)
CALCIUM SERPL-MCNC: 9 MG/DL (ref 8.5–10.5)
CHLORIDE SERPL-SCNC: 107 MMOL/L (ref 96–112)
CHOLEST SERPL-MCNC: 229 MG/DL (ref 100–199)
CO2 SERPL-SCNC: 23 MMOL/L (ref 20–33)
CREAT SERPL-MCNC: 0.64 MG/DL (ref 0.5–1.4)
EOSINOPHIL # BLD AUTO: 0.08 K/UL (ref 0–0.51)
EOSINOPHIL NFR BLD: 1.1 % (ref 0–6.9)
ERYTHROCYTE [DISTWIDTH] IN BLOOD BY AUTOMATED COUNT: 43.8 FL (ref 35.9–50)
FASTING STATUS PATIENT QL REPORTED: NORMAL
GLOBULIN SER CALC-MCNC: 2.8 G/DL (ref 1.9–3.5)
GLUCOSE SERPL-MCNC: 93 MG/DL (ref 65–99)
HCT VFR BLD AUTO: 39.3 % (ref 37–47)
HDLC SERPL-MCNC: 47 MG/DL
HGB BLD-MCNC: 12.6 G/DL (ref 12–16)
IMM GRANULOCYTES # BLD AUTO: 0.04 K/UL (ref 0–0.11)
IMM GRANULOCYTES NFR BLD AUTO: 0.5 % (ref 0–0.9)
LDLC SERPL CALC-MCNC: 147 MG/DL
LYMPHOCYTES # BLD AUTO: 1.81 K/UL (ref 1–4.8)
LYMPHOCYTES NFR BLD: 24.7 % (ref 22–41)
MCH RBC QN AUTO: 29.4 PG (ref 27–33)
MCHC RBC AUTO-ENTMCNC: 32.1 G/DL (ref 33.6–35)
MCV RBC AUTO: 91.8 FL (ref 81.4–97.8)
MONOCYTES # BLD AUTO: 0.48 K/UL (ref 0–0.85)
MONOCYTES NFR BLD AUTO: 6.5 % (ref 0–13.4)
NEUTROPHILS # BLD AUTO: 4.88 K/UL (ref 2–7.15)
NEUTROPHILS NFR BLD: 66.7 % (ref 44–72)
NRBC # BLD AUTO: 0 K/UL
NRBC BLD-RTO: 0 /100 WBC
PLATELET # BLD AUTO: 254 K/UL (ref 164–446)
PMV BLD AUTO: 12.8 FL (ref 9–12.9)
POTASSIUM SERPL-SCNC: 4.6 MMOL/L (ref 3.6–5.5)
PROT SERPL-MCNC: 7 G/DL (ref 6–8.2)
RBC # BLD AUTO: 4.28 M/UL (ref 4.2–5.4)
SODIUM SERPL-SCNC: 141 MMOL/L (ref 135–145)
TRIGL SERPL-MCNC: 173 MG/DL (ref 0–149)
TSH SERPL DL<=0.005 MIU/L-ACNC: 2.93 UIU/ML (ref 0.38–5.33)
VIT B12 SERPL-MCNC: 510 PG/ML (ref 211–911)
WBC # BLD AUTO: 7.3 K/UL (ref 4.8–10.8)

## 2021-12-14 PROCEDURE — 99214 OFFICE O/P EST MOD 30 MIN: CPT | Mod: 25 | Performed by: NURSE PRACTITIONER

## 2021-12-14 PROCEDURE — 82607 VITAMIN B-12: CPT

## 2021-12-14 PROCEDURE — 80061 LIPID PANEL: CPT

## 2021-12-14 PROCEDURE — 85025 COMPLETE CBC W/AUTO DIFF WBC: CPT

## 2021-12-14 PROCEDURE — 80053 COMPREHEN METABOLIC PANEL: CPT

## 2021-12-14 PROCEDURE — 84443 ASSAY THYROID STIM HORMONE: CPT

## 2021-12-14 PROCEDURE — 36415 COLL VENOUS BLD VENIPUNCTURE: CPT

## 2021-12-14 PROCEDURE — 90471 IMMUNIZATION ADMIN: CPT | Performed by: NURSE PRACTITIONER

## 2021-12-14 PROCEDURE — 82306 VITAMIN D 25 HYDROXY: CPT

## 2021-12-14 PROCEDURE — 90715 TDAP VACCINE 7 YRS/> IM: CPT | Performed by: NURSE PRACTITIONER

## 2021-12-14 RX ORDER — NAPROXEN 500 MG/1
500 TABLET ORAL
Qty: 90 TABLET | Refills: 0 | Status: SHIPPED | OUTPATIENT
Start: 2021-12-14 | End: 2022-03-14

## 2021-12-14 RX ORDER — BUPROPION HYDROCHLORIDE 150 MG/1
150 TABLET, EXTENDED RELEASE ORAL DAILY
Qty: 30 TABLET | Refills: 0 | Status: SHIPPED | OUTPATIENT
Start: 2021-12-14 | End: 2022-01-21

## 2021-12-14 RX ORDER — ACYCLOVIR 800 MG/1
TABLET ORAL
COMMUNITY
Start: 2021-11-04 | End: 2021-12-14

## 2021-12-14 ASSESSMENT — PATIENT HEALTH QUESTIONNAIRE - PHQ9
7. TROUBLE CONCENTRATING ON THINGS, SUCH AS READING THE NEWSPAPER OR WATCHING TELEVISION: NOT AT ALL
1. LITTLE INTEREST OR PLEASURE IN DOING THINGS: SEVERAL DAYS
3. TROUBLE FALLING OR STAYING ASLEEP OR SLEEPING TOO MUCH: NOT AT ALL
5. POOR APPETITE OR OVEREATING: SEVERAL DAYS
SUM OF ALL RESPONSES TO PHQ9 QUESTIONS 1 AND 2: 1
2. FEELING DOWN, DEPRESSED, IRRITABLE, OR HOPELESS: NOT AT ALL
4. FEELING TIRED OR HAVING LITTLE ENERGY: SEVERAL DAYS
6. FEELING BAD ABOUT YOURSELF - OR THAT YOU ARE A FAILURE OR HAVE LET YOURSELF OR YOUR FAMILY DOWN: NOT AL ALL
SUM OF ALL RESPONSES TO PHQ QUESTIONS 1-9: 3
9. THOUGHTS THAT YOU WOULD BE BETTER OFF DEAD, OR OF HURTING YOURSELF: NOT AT ALL
8. MOVING OR SPEAKING SO SLOWLY THAT OTHER PEOPLE COULD HAVE NOTICED. OR THE OPPOSITE, BEING SO FIGETY OR RESTLESS THAT YOU HAVE BEEN MOVING AROUND A LOT MORE THAN USUAL: NOT AT ALL

## 2021-12-14 ASSESSMENT — FIBROSIS 4 INDEX: FIB4 SCORE: 0.58

## 2021-12-14 NOTE — ASSESSMENT & PLAN NOTE
-Continue Wellbutrin 150 mg SR daily.  -Recommend that patient contact this provider in the next 2 to 4 months regarding any change in side effects on the new dose of Wellbutrin.

## 2021-12-14 NOTE — PROGRESS NOTES
Subjective:     Chief Complaint   Patient presents with   • Medication Management         HPI:   Marcial presents today with     Problem   Hair Loss    Chronic in nature. States she stopped the spironolactone related to dizziness and low BP.      Anxiety    Chronic in nature. States that she has had episodes of worsening anxiety. States she is using CBG which she states is helping her with anxiety and digestive. States that she has been dealing with conflict with co-workers. States Wellbutrin helps most of the time, but in chaos situations she states the Wellbutrin doesn't help quite as much. States she has good coping skills.      Current Mild Episode of Major Depressive Disorder Without Prior Episode (Hcc)    Chronic in nature.  Stable and improved.  Patient states that Wellbutrin has been significantly helping with feelings of sadness.  Denies any homicidal or suicidal ideation.    Depression Screening    Little interest or pleasure in doing things?   Several days  Feeling down, depressed , or hopeless?  Not at all  Trouble falling or staying asleep, or sleeping too much?   Not at all  Feeling tired or having little energy?   Several days  Poor appetite or overeating?   Several days  Feeling bad about yourself - or that you are a failure or have let yourself or your family down?  Not al all  Trouble concentrating on things, such as reading the newspaper or watching television?  Not at all  Moving or speaking so slowly that other people could have noticed.  Or the opposite - being so fidgety or restless that you have been moving around a lot more than usual?   Not at all  Thoughts that you would be better off dead, or of hurting yourself?   Not at all  Patient Health Questionnaire Score:  3      If depressive symptoms identified deferred to follow up visit unless specifically addressed in assesment and plan.    Interpretation of PHQ-9 Total Score   Score Severity   1-4 No Depression   5-9 Mild Depression   10-14  "Moderate Depression   15-19 Moderately Severe Depression   20-27 Severe Depression             Current Outpatient Medications Ordered in Epic   Medication Sig Dispense Refill   • buPROPion SR (WELLBUTRIN-SR) 150 MG TABLET SR 12 HR sustained-release tablet Take 1 Tablet by mouth every day. 30 Tablet 0   • naproxen (NAPROSYN) 500 MG Tab Take 1 Tablet by mouth 1 time a day as needed for up to 90 days. 90 Tablet 0   • SUMAtriptan (IMITREX) 50 MG Tab Take 1 tablet by mouth one time as needed for Migraine for up to 1 dose. 10 tablet 3   • ergocalciferol (DRISDOL) 94758 UNIT capsule Take 1 capsule by mouth every 7 days. 12 capsule 3     No current [facility]-ordered facility-administered medications on file.       Health Maintenance: follows with gynecology    ROS:  Gen: no fevers/chills, no changes in weight  Eyes: no changes in vision  ENT: no sore throat, no hearing loss, no bloody nose  Pulm: no sob, no cough  CV: no chest pain, no palpitations  GI: no nausea/vomiting, no diarrhea  : no dysuria  MSk: no myalgias  Skin: no rash  Neuro: no headaches, no numbness/tingling  Heme/Lymph: no easy bruising      Objective:     Exam:  BP (!) 96/66 (BP Location: Right arm, Patient Position: Sitting, BP Cuff Size: Adult)   Pulse 80   Temp 36.5 °C (97.7 °F) (Temporal)   Resp 20   Ht 1.549 m (5' 1\")   Wt 69.1 kg (152 lb 6.4 oz)   SpO2 96%   BMI 28.80 kg/m²  Body mass index is 28.8 kg/m².    Gen: Alert and oriented, No apparent distress.  Neck: Neck is supple without lymphadenopathy.  Lungs: Normal effort, CTA bilaterally, no wheezes, rhonchi, or rales  CV: Regular rate and rhythm. No murmurs, rubs, or gallops.  Ext: No clubbing, cyanosis, edema.      Assessment & Plan:     36 y.o. female with the following -     Problem List Items Addressed This Visit     Anxiety     -Continue Wellbutrin 150 mg SR daily.  -Recommend that patient contact this provider in the next 2 to 4 months regarding any change in side effects on the new dose " of Wellbutrin.         Relevant Medications    buPROPion SR (WELLBUTRIN-SR) 150 MG TABLET SR 12 HR sustained-release tablet    Current mild episode of major depressive disorder without prior episode (HCC)     -Increase Wellbutrin to 150 mg p.o. daily.  -Continue cognitive behavioral interventions         Relevant Medications    buPROPion SR (WELLBUTRIN-SR) 150 MG TABLET SR 12 HR sustained-release tablet    Hair loss     -Continue follow-up with dermatology as needed.         Primary osteoarthritis of both wrists    Relevant Medications    naproxen (NAPROSYN) 500 MG Tab      Other Visit Diagnoses     Need for vaccination        Relevant Orders    Tdap Vaccine =>6YO IM (Completed)          Return in about 3 months (around 3/14/2022) for Med Mgmt.    Please note that this dictation was created using voice recognition software. I have made every reasonable attempt to correct obvious errors, but I expect that there are errors of grammar and possibly content that I did not discover before finalizing the note.

## 2021-12-17 LAB — 25(OH)D3 SERPL-MCNC: 24 NG/ML (ref 30–80)

## 2022-01-21 DIAGNOSIS — F32.0 CURRENT MILD EPISODE OF MAJOR DEPRESSIVE DISORDER WITHOUT PRIOR EPISODE (HCC): ICD-10-CM

## 2022-01-21 RX ORDER — BUPROPION HYDROCHLORIDE 150 MG/1
150 TABLET, EXTENDED RELEASE ORAL DAILY
Qty: 30 TABLET | Refills: 0 | Status: SHIPPED | OUTPATIENT
Start: 2022-01-21 | End: 2022-03-15

## 2022-03-15 ENCOUNTER — TELEMEDICINE (OUTPATIENT)
Dept: MEDICAL GROUP | Facility: PHYSICIAN GROUP | Age: 37
End: 2022-03-15
Payer: COMMERCIAL

## 2022-03-15 VITALS — HEART RATE: 104 BPM | BODY MASS INDEX: 28.7 KG/M2 | HEIGHT: 61 IN | WEIGHT: 152 LBS

## 2022-03-15 DIAGNOSIS — G43.109 MIGRAINE WITH AURA AND WITHOUT STATUS MIGRAINOSUS, NOT INTRACTABLE: ICD-10-CM

## 2022-03-15 DIAGNOSIS — E78.2 MIXED HYPERLIPIDEMIA: ICD-10-CM

## 2022-03-15 DIAGNOSIS — E55.9 VITAMIN D DEFICIENCY: ICD-10-CM

## 2022-03-15 DIAGNOSIS — F41.9 ANXIETY: ICD-10-CM

## 2022-03-15 DIAGNOSIS — F32.0 CURRENT MILD EPISODE OF MAJOR DEPRESSIVE DISORDER WITHOUT PRIOR EPISODE (HCC): ICD-10-CM

## 2022-03-15 PROCEDURE — 99214 OFFICE O/P EST MOD 30 MIN: CPT | Mod: 95 | Performed by: NURSE PRACTITIONER

## 2022-03-15 RX ORDER — BUPROPION HYDROCHLORIDE 100 MG/1
100 TABLET, EXTENDED RELEASE ORAL
Qty: 90 TABLET | Refills: 3 | Status: SHIPPED | OUTPATIENT
Start: 2022-03-15 | End: 2023-04-10

## 2022-03-15 RX ORDER — ERGOCALCIFEROL 1.25 MG/1
50000 CAPSULE ORAL
Qty: 12 CAPSULE | Refills: 3 | Status: SHIPPED | OUTPATIENT
Start: 2022-03-15 | End: 2022-10-27 | Stop reason: SDUPTHER

## 2022-03-15 RX ORDER — SUMATRIPTAN 50 MG/1
50 TABLET, FILM COATED ORAL
Qty: 10 TABLET | Refills: 3 | Status: SHIPPED | OUTPATIENT
Start: 2022-03-15 | End: 2022-10-27 | Stop reason: SDUPTHER

## 2022-03-15 ASSESSMENT — FIBROSIS 4 INDEX: FIB4 SCORE: 0.36

## 2022-03-15 ASSESSMENT — ANXIETY QUESTIONNAIRES
2. NOT BEING ABLE TO STOP OR CONTROL WORRYING: NEARLY EVERY DAY
5. BEING SO RESTLESS THAT IT IS HARD TO SIT STILL: NOT AT ALL
1. FEELING NERVOUS, ANXIOUS, OR ON EDGE: MORE THAN HALF THE DAYS
6. BECOMING EASILY ANNOYED OR IRRITABLE: SEVERAL DAYS
3. WORRYING TOO MUCH ABOUT DIFFERENT THINGS: NEARLY EVERY DAY
GAD7 TOTAL SCORE: 10
7. FEELING AFRAID AS IF SOMETHING AWFUL MIGHT HAPPEN: NOT AT ALL
4. TROUBLE RELAXING: SEVERAL DAYS

## 2022-03-15 ASSESSMENT — PATIENT HEALTH QUESTIONNAIRE - PHQ9: CLINICAL INTERPRETATION OF PHQ2 SCORE: 0

## 2022-03-15 NOTE — ASSESSMENT & PLAN NOTE
-Continue Wellbutrin 100 mg SR PO daily.  - will continue to work on meditation and stress relief techniques  -Discussed option of counseling, declines at this time.

## 2022-03-15 NOTE — PROGRESS NOTES
Virtual Visit: Established Patient   This visit was conducted via Zoom using secure and encrypted videoconferencing technology.   The patient was in their home in the [facility].    The patient's identity was confirmed and verbal consent was obtained for this virtual visit.     Subjective:   CC:   Chief Complaint   Patient presents with   • Follow-Up     anxiety       Marcial Gao is a 36 y.o. female presenting for evaluation and management of:    Problem   Anxiety    Chronic in nature. States that she has had episodes of worsening anxiety. States she is using CBG which she states is helping her with anxiety and digestive. States that she has been dealing with conflict with co-workers states that this continues to be a toxic environment and that she has been looking for a new position, but she may be moved to a new position. States Wellbutrin with higher dose she had elevated heart rate and palpitations. States that she is currently back on the 100 mg dose which is working ok, but she is continuing to have increased stress.     Current Mild Episode of Major Depressive Disorder Without Prior Episode (Hcc)    Chronic in nature.  States controlled and improved.  Patient states that Wellbutrin has been significantly helping with feelings of sadness. She feels that the changes upcoming are good.States that she feels depression and hopelessness has resolved.  Denies any homicidal or suicidal ideation.           Mixed Hyperlipidemia    Chronic in nature. Lipids elevated on last labs in December. She is working on getting everything back on track. She states that she has been working on healthy diet, states she has been walking with her dogs more often which is helpful.          ROS   Denies chest pain, palpitations, dizziness, blurry vision. HA.    Current medicines (including changes today)  Current Outpatient Medications   Medication Sig Dispense Refill   • buPROPion SR (WELLBUTRIN-SR) 100 MG TABLET SR  "12 HR Take 1 Tablet by mouth every day. 90 Tablet 3   • SUMAtriptan (IMITREX) 50 MG Tab Take 1 Tablet by mouth one time as needed for Migraine for up to 1 dose. 10 Tablet 3   • ergocalciferol (DRISDOL) 86045 UNIT capsule Take 1 Capsule by mouth every 7 days. 12 Capsule 3     No current facility-administered medications for this visit.       Patient Active Problem List    Diagnosis Date Noted   • Vitamin D deficiency 03/25/2021   • Hair loss 12/31/2020   • Irregular periods 09/04/2020   • Anxiety 04/30/2020   • Current mild episode of major depressive disorder without prior episode (HCC) 04/30/2020   • Sinusitis 09/12/2018   • Primary osteoarthritis of both wrists 05/25/2018   • Mixed hyperlipidemia 05/25/2018   • Chronic fatigue 05/25/2018   • Migraine with aura and without status migrainosus, not intractable 05/25/2018        Objective:   Pulse (!) 104   Ht 1.549 m (5' 1\")   Wt 68.9 kg (152 lb)   LMP 02/17/2022   BMI 28.72 kg/m²     Physical Exam:  Constitutional: Alert, no distress, well-groomed.  Skin: No rashes in visible areas.  Eye: Round. Conjunctiva clear, lids normal. No icterus.   ENMT: Lips pink without lesions, good dentition, moist mucous membranes. Phonation normal.  Neck: No masses, no thyromegaly. Moves freely without pain.  Respiratory: Unlabored respiratory effort, no cough or audible wheeze  Psych: Alert and oriented x3, normal affect and mood.     Assessment and Plan:   The following treatment plan was discussed:     Problem List Items Addressed This Visit     Anxiety     -Continue Wellbutrin 100 mg SR PO daily.  - will continue to work on meditation and stress relief techniques  -Discussed option of counseling, declines at this time.          Relevant Medications    buPROPion SR (WELLBUTRIN-SR) 100 MG TABLET SR 12 HR    Current mild episode of major depressive disorder without prior episode (HCC)    Relevant Medications    buPROPion SR (WELLBUTRIN-SR) 100 MG TABLET SR 12 HR    Migraine with " aura and without status migrainosus, not intractable    Relevant Medications    buPROPion SR (WELLBUTRIN-SR) 100 MG TABLET SR 12 HR    SUMAtriptan (IMITREX) 50 MG Tab    Mixed hyperlipidemia     -states she is increasing her walking  -states she has decreased carbs, increased fish, and salad.          Vitamin D deficiency    Relevant Medications    ergocalciferol (DRISDOL) 00167 UNIT capsule          Follow-up: Return in about 6 months (around 9/15/2022), or if symptoms worsen or fail to improve, for anxiety, repeat labs.

## 2022-03-15 NOTE — ASSESSMENT & PLAN NOTE
-states she is increasing her walking  -states she has decreased carbs, increased fish, and salad.

## 2022-04-25 ENCOUNTER — PATIENT MESSAGE (OUTPATIENT)
Dept: MEDICAL GROUP | Facility: PHYSICIAN GROUP | Age: 37
End: 2022-04-25
Payer: COMMERCIAL

## 2022-04-25 DIAGNOSIS — B37.31 YEAST VAGINITIS: ICD-10-CM

## 2022-04-25 RX ORDER — FLUCONAZOLE 150 MG/1
150 TABLET ORAL DAILY
Qty: 2 TABLET | Refills: 0 | Status: SHIPPED | OUTPATIENT
Start: 2022-04-25 | End: 2022-10-27

## 2022-06-29 ENCOUNTER — APPOINTMENT (OUTPATIENT)
Dept: MEDICAL GROUP | Facility: PHYSICIAN GROUP | Age: 37
End: 2022-06-29
Payer: COMMERCIAL

## 2022-06-29 DIAGNOSIS — E78.2 MIXED HYPERLIPIDEMIA: ICD-10-CM

## 2022-06-29 DIAGNOSIS — U07.1 COVID: ICD-10-CM

## 2022-06-29 DIAGNOSIS — R09.81 CONGESTION OF NASAL SINUS: ICD-10-CM

## 2022-06-29 DIAGNOSIS — E55.9 VITAMIN D DEFICIENCY: ICD-10-CM

## 2022-06-29 RX ORDER — AZITHROMYCIN 250 MG/1
TABLET, FILM COATED ORAL
Qty: 6 TABLET | Refills: 0 | Status: SHIPPED | OUTPATIENT
Start: 2022-06-29 | End: 2022-10-27

## 2022-06-29 NOTE — ASSESSMENT & PLAN NOTE
Home test pos 6/27  Sore throat fever body aches nasal dc  No SOB  Forms for leave  rtc if concerns  ER if SOB

## 2022-06-29 NOTE — PROGRESS NOTES
Pt presents to  with forms for COVID leave  6/27 covid pos   Tentative rtw 7/5  [facility] recs  Reports green/yellow nasal dc and would like zpak  Tolerates well  No SOB  Pt seen in parking lot to hand off forms    ER if SOB  rtc if concerns    Forms completed, scanned into [employer] and faxed out

## 2022-07-08 ENCOUNTER — PATIENT MESSAGE (OUTPATIENT)
Dept: MEDICAL GROUP | Facility: PHYSICIAN GROUP | Age: 37
End: 2022-07-08
Payer: COMMERCIAL

## 2022-07-12 ENCOUNTER — TELEMEDICINE (OUTPATIENT)
Dept: MEDICAL GROUP | Facility: PHYSICIAN GROUP | Age: 37
End: 2022-07-12
Payer: COMMERCIAL

## 2022-07-12 VITALS — HEIGHT: 61 IN | WEIGHT: 152 LBS | BODY MASS INDEX: 28.7 KG/M2

## 2022-07-12 DIAGNOSIS — U07.1 COVID: ICD-10-CM

## 2022-07-12 DIAGNOSIS — R53.82 CHRONIC FATIGUE: ICD-10-CM

## 2022-07-12 PROCEDURE — 99213 OFFICE O/P EST LOW 20 MIN: CPT | Mod: 95 | Performed by: FAMILY MEDICINE

## 2022-07-12 ASSESSMENT — ENCOUNTER SYMPTOMS
COUGH: 0
CHILLS: 0
SORE THROAT: 0
SHORTNESS OF BREATH: 1
MYALGIAS: 0
PALPITATIONS: 0
DOUBLE VISION: 0
HEADACHES: 0
NAUSEA: 0
VOMITING: 0
BLURRED VISION: 0
FEVER: 0
DIZZINESS: 0

## 2022-07-12 ASSESSMENT — FIBROSIS 4 INDEX: FIB4 SCORE: 0.36

## 2022-07-12 NOTE — PROGRESS NOTES
"Subjective:     Virtual Visit: Established Patient   This visit was conducted via Zoom using secure and encrypted videoconferencing technology.   The patient was in a private location outside of their home in the state of [place].    The patient's identity was confirmed and verbal consent was obtained for this virtual visit.    Subjective:   CC:   Chief Complaint   Patient presents with   • Fatigue     AM's     Marcial Gao is a 36 y.o. female presenting for evaluation and management of:    Review fatigue and resolved covid    ROS   fatigue    Current medicines (including changes today)  Current Outpatient Medications   Medication Sig Dispense Refill   • azithromycin (ZITHROMAX) 250 MG Tab 2 tabs po for 1 day, then 1 tab po for 4 days 6 Tablet 0   • fluconazole (DIFLUCAN) 150 MG tablet Take 1 Tablet by mouth every day. 2 Tablet 0   • buPROPion SR (WELLBUTRIN-SR) 100 MG TABLET SR 12 HR Take 1 Tablet by mouth every day. 90 Tablet 3   • SUMAtriptan (IMITREX) 50 MG Tab Take 1 Tablet by mouth one time as needed for Migraine for up to 1 dose. 10 Tablet 3   • ergocalciferol (DRISDOL) 91660 UNIT capsule Take 1 Capsule by mouth every 7 days. 12 Capsule 3     No current facility-administered medications for this visit.       Patient Active Problem List    Diagnosis Date Noted   • COVID 06/29/2022   • Congestion of nasal sinus 06/29/2022   • Vitamin D deficiency 03/25/2021   • Hair loss 12/31/2020   • Irregular periods 09/04/2020   • Anxiety 04/30/2020   • Current mild episode of major depressive disorder without prior episode (HCC) 04/30/2020   • Sinusitis 09/12/2018   • Primary osteoarthritis of both wrists 05/25/2018   • Mixed hyperlipidemia 05/25/2018   • Chronic fatigue 05/25/2018   • Migraine with aura and without status migrainosus, not intractable 05/25/2018        Objective:   Ht 1.549 m (5' 1\")   Wt 68.9 kg (152 lb)   BMI 28.72 kg/m²     Physical Exam:  Constitutional: Alert, no distress, " well-groomed.  Skin: No rashes in visible areas.  Eye: Round. Conjunctiva clear, lids normal. No icterus.   ENMT: Lips pink without lesions, good dentition, moist mucous membranes. Phonation normal.  Neck: No masses, no thyromegaly. Moves freely without pain.  Respiratory: Unlabored respiratory effort, no cough or audible wheeze  Psych: Alert and oriented x3, normal affect and mood.     Assessment and Plan:   The following treatment plan was discussed:     1. Chronic fatigue  - CBC WITH DIFFERENTIAL; Future    2. COVID      Follow-up: Return in about 4 weeks (around 8/9/2022), or if symptoms worsen or fail to improve.         CC:  Covid check up    HPI:   Marcial presents today with     Started as virtual visit, sound did not work,   In [place]  On Zoom  In secure location   Used telephone and video     Reports continued fatigue and dec exercise tolerance   no SOB   But has chest tightness    Needs return to work paperwork July 5th     Chronic fatigue  Had TSH tested recently    Needs inpatient appt?    No problems updated.    Current Outpatient Medications Ordered in Epic   Medication Sig Dispense Refill   • azithromycin (ZITHROMAX) 250 MG Tab 2 tabs po for 1 day, then 1 tab po for 4 days 6 Tablet 0   • fluconazole (DIFLUCAN) 150 MG tablet Take 1 Tablet by mouth every day. 2 Tablet 0   • buPROPion SR (WELLBUTRIN-SR) 100 MG TABLET SR 12 HR Take 1 Tablet by mouth every day. 90 Tablet 3   • SUMAtriptan (IMITREX) 50 MG Tab Take 1 Tablet by mouth one time as needed for Migraine for up to 1 dose. 10 Tablet 3   • ergocalciferol (DRISDOL) 07187 UNIT capsule Take 1 Capsule by mouth every 7 days. 12 Capsule 3     No current [facility]-ordered facility-administered medications on file.     ROS:  Review of Systems   Constitutional: Positive for malaise/fatigue. Negative for chills and fever.   HENT: Negative for ear pain and sore throat.    Eyes: Negative for blurred vision and double vision.   Respiratory: Positive for shortness of  "breath. Negative for cough.    Cardiovascular: Negative for chest pain and palpitations.   Gastrointestinal: Negative for nausea and vomiting.   Genitourinary: Negative for dysuria and hematuria.   Musculoskeletal: Negative for myalgias.   Neurological: Negative for dizziness and headaches.       Objective:     Exam:  Ht 1.549 m (5' 1\")   Wt 68.9 kg (152 lb)   BMI 28.72 kg/m²  Body mass index is 28.72 kg/m².      Physical Exam  Constitutional:       General: She is not in acute distress.     Appearance: Normal appearance. She is not ill-appearing, toxic-appearing or diaphoretic.   HENT:      Head: Normocephalic and atraumatic.   Pulmonary:      Effort: No respiratory distress.   Neurological:      Mental Status: She is alert.     speaking in full sentences    Assessment & Plan:     36 y.o. female with the following -     Problem List Items Addressed This Visit     Chronic fatigue    Relevant Orders    CBC WITH DIFFERENTIAL        No follow-ups on file.    Please note that this dictation was created using voice recognition software. I have made every reasonable attempt to correct obvious errors, but I expect that there are errors of grammar and possibly content that I did not discover before finalizing the note.      "

## 2022-07-23 NOTE — ASSESSMENT & PLAN NOTE
Started about 1 week ago with a sore throat.  Has progressed to sinus pressure and congestion, green drainage, and cough.  Used OTC meds and rested.  Cough is still bad at night.  Started a zpak yesterday.  Discussed plan.  She would like to proceed with trials of tessalon perles and phenergan cough syrup without codeine.   5

## 2022-09-20 ENCOUNTER — APPOINTMENT (OUTPATIENT)
Dept: MEDICAL GROUP | Facility: PHYSICIAN GROUP | Age: 37
End: 2022-09-20
Payer: COMMERCIAL

## 2022-10-27 ENCOUNTER — OFFICE VISIT (OUTPATIENT)
Dept: MEDICAL GROUP | Facility: PHYSICIAN GROUP | Age: 37
End: 2022-10-27
Payer: COMMERCIAL

## 2022-10-27 ENCOUNTER — HOSPITAL ENCOUNTER (OUTPATIENT)
Dept: LAB | Facility: MEDICAL CENTER | Age: 37
End: 2022-10-27
Attending: NURSE PRACTITIONER
Payer: COMMERCIAL

## 2022-10-27 VITALS
WEIGHT: 152 LBS | DIASTOLIC BLOOD PRESSURE: 68 MMHG | OXYGEN SATURATION: 98 % | HEART RATE: 85 BPM | HEIGHT: 60 IN | BODY MASS INDEX: 29.84 KG/M2 | TEMPERATURE: 96.3 F | SYSTOLIC BLOOD PRESSURE: 106 MMHG

## 2022-10-27 DIAGNOSIS — M19.032 PRIMARY OSTEOARTHRITIS OF BOTH WRISTS: ICD-10-CM

## 2022-10-27 DIAGNOSIS — E55.9 VITAMIN D DEFICIENCY: ICD-10-CM

## 2022-10-27 DIAGNOSIS — G43.109 MIGRAINE WITH AURA AND WITHOUT STATUS MIGRAINOSUS, NOT INTRACTABLE: ICD-10-CM

## 2022-10-27 DIAGNOSIS — R53.82 CHRONIC FATIGUE: ICD-10-CM

## 2022-10-27 DIAGNOSIS — Z00.00 WELLNESS EXAMINATION: ICD-10-CM

## 2022-10-27 DIAGNOSIS — M19.031 PRIMARY OSTEOARTHRITIS OF BOTH WRISTS: ICD-10-CM

## 2022-10-27 DIAGNOSIS — F32.0 CURRENT MILD EPISODE OF MAJOR DEPRESSIVE DISORDER WITHOUT PRIOR EPISODE (HCC): ICD-10-CM

## 2022-10-27 PROBLEM — R09.81 CONGESTION OF NASAL SINUS: Status: RESOLVED | Noted: 2022-06-29 | Resolved: 2022-10-27

## 2022-10-27 LAB
25(OH)D3 SERPL-MCNC: 35 NG/ML (ref 30–100)
ALBUMIN SERPL BCP-MCNC: 4.2 G/DL (ref 3.2–4.9)
ALBUMIN/GLOB SERPL: 1.4 G/DL
ALP SERPL-CCNC: 72 U/L (ref 30–99)
ALT SERPL-CCNC: 13 U/L (ref 2–50)
ANION GAP SERPL CALC-SCNC: 8 MMOL/L (ref 7–16)
AST SERPL-CCNC: 15 U/L (ref 12–45)
BASOPHILS # BLD AUTO: 0.7 % (ref 0–1.8)
BASOPHILS # BLD: 0.05 K/UL (ref 0–0.12)
BILIRUB SERPL-MCNC: 0.9 MG/DL (ref 0.1–1.5)
BUN SERPL-MCNC: 12 MG/DL (ref 8–22)
CALCIUM SERPL-MCNC: 8.9 MG/DL (ref 8.5–10.5)
CHLORIDE SERPL-SCNC: 102 MMOL/L (ref 96–112)
CHOLEST SERPL-MCNC: 233 MG/DL (ref 100–199)
CO2 SERPL-SCNC: 22 MMOL/L (ref 20–33)
CREAT SERPL-MCNC: 0.69 MG/DL (ref 0.5–1.4)
EOSINOPHIL # BLD AUTO: 0.06 K/UL (ref 0–0.51)
EOSINOPHIL NFR BLD: 0.8 % (ref 0–6.9)
ERYTHROCYTE [DISTWIDTH] IN BLOOD BY AUTOMATED COUNT: 42.7 FL (ref 35.9–50)
FASTING STATUS PATIENT QL REPORTED: NORMAL
GFR SERPLBLD CREATININE-BSD FMLA CKD-EPI: 113 ML/MIN/1.73 M 2
GLOBULIN SER CALC-MCNC: 2.9 G/DL (ref 1.9–3.5)
GLUCOSE SERPL-MCNC: 100 MG/DL (ref 65–99)
HCT VFR BLD AUTO: 40.8 % (ref 37–47)
HDLC SERPL-MCNC: 49 MG/DL
HGB BLD-MCNC: 13.4 G/DL (ref 12–16)
IMM GRANULOCYTES # BLD AUTO: 0.06 K/UL (ref 0–0.11)
IMM GRANULOCYTES NFR BLD AUTO: 0.8 % (ref 0–0.9)
LDLC SERPL CALC-MCNC: 159 MG/DL
LYMPHOCYTES # BLD AUTO: 1.81 K/UL (ref 1–4.8)
LYMPHOCYTES NFR BLD: 24.9 % (ref 22–41)
MCH RBC QN AUTO: 29.3 PG (ref 27–33)
MCHC RBC AUTO-ENTMCNC: 32.8 G/DL (ref 33.6–35)
MCV RBC AUTO: 89.3 FL (ref 81.4–97.8)
MONOCYTES # BLD AUTO: 0.46 K/UL (ref 0–0.85)
MONOCYTES NFR BLD AUTO: 6.3 % (ref 0–13.4)
NEUTROPHILS # BLD AUTO: 4.82 K/UL (ref 2–7.15)
NEUTROPHILS NFR BLD: 66.5 % (ref 44–72)
NRBC # BLD AUTO: 0 K/UL
NRBC BLD-RTO: 0 /100 WBC
PLATELET # BLD AUTO: 252 K/UL (ref 164–446)
PMV BLD AUTO: 12.5 FL (ref 9–12.9)
POTASSIUM SERPL-SCNC: 4.3 MMOL/L (ref 3.6–5.5)
PROT SERPL-MCNC: 7.1 G/DL (ref 6–8.2)
RBC # BLD AUTO: 4.57 M/UL (ref 4.2–5.4)
SODIUM SERPL-SCNC: 132 MMOL/L (ref 135–145)
T3 SERPL-MCNC: 114 NG/DL (ref 60–181)
TRIGL SERPL-MCNC: 124 MG/DL (ref 0–149)
TSH SERPL DL<=0.005 MIU/L-ACNC: 2.49 UIU/ML (ref 0.38–5.33)
VIT B12 SERPL-MCNC: 428 PG/ML (ref 211–911)
WBC # BLD AUTO: 7.3 K/UL (ref 4.8–10.8)

## 2022-10-27 PROCEDURE — 85025 COMPLETE CBC W/AUTO DIFF WBC: CPT

## 2022-10-27 PROCEDURE — 84480 ASSAY TRIIODOTHYRONINE (T3): CPT

## 2022-10-27 PROCEDURE — 80053 COMPREHEN METABOLIC PANEL: CPT

## 2022-10-27 PROCEDURE — 82306 VITAMIN D 25 HYDROXY: CPT

## 2022-10-27 PROCEDURE — 84443 ASSAY THYROID STIM HORMONE: CPT

## 2022-10-27 PROCEDURE — 99214 OFFICE O/P EST MOD 30 MIN: CPT | Performed by: NURSE PRACTITIONER

## 2022-10-27 PROCEDURE — 80061 LIPID PANEL: CPT

## 2022-10-27 PROCEDURE — 82607 VITAMIN B-12: CPT

## 2022-10-27 PROCEDURE — 36415 COLL VENOUS BLD VENIPUNCTURE: CPT

## 2022-10-27 RX ORDER — SUMATRIPTAN 50 MG/1
50 TABLET, FILM COATED ORAL
Qty: 10 TABLET | Refills: 3 | Status: SHIPPED | OUTPATIENT
Start: 2022-10-27 | End: 2023-12-07

## 2022-10-27 RX ORDER — ERGOCALCIFEROL 1.25 MG/1
50000 CAPSULE ORAL
Qty: 12 CAPSULE | Refills: 3 | Status: SHIPPED | OUTPATIENT
Start: 2022-10-27 | End: 2023-12-07

## 2022-10-27 RX ORDER — NAPROXEN 500 MG/1
500 TABLET ORAL
Qty: 60 TABLET | Refills: 1 | Status: SHIPPED | OUTPATIENT
Start: 2022-10-27 | End: 2023-02-09

## 2022-10-27 ASSESSMENT — ENCOUNTER SYMPTOMS
PALPITATIONS: 0
FEVER: 0
DEPRESSION: 1
CHILLS: 0
HEADACHES: 0
DIZZINESS: 0
HEARTBURN: 0
COUGH: 0
SHORTNESS OF BREATH: 0

## 2022-10-27 ASSESSMENT — FIBROSIS 4 INDEX: FIB4 SCORE: 0.37

## 2022-10-27 NOTE — PROGRESS NOTES
Subjective:     Chief Complaint   Patient presents with    Requesting Labs    Medication Refill     Pt states she's falling asleep behind the wheel        HPI:   Marcial presents today with     Problem   Current Mild Episode of Major Depressive Disorder Without Prior Episode (Hcc)    Chronic in nature.  States she may have some increased fatigue related to worsening seasonal depression. States otherwise well-controlled.  Patient states that Wellbutrin has been significantly helping with feelings of sadness. She feels that the changes upcoming are good. States that she feels depression and hopelessness has resolved.  Denies any homicidal or suicidal ideation.           Chronic Fatigue    This is a chronic issue which has worsened significantly recently. States feels like she is almost falling asleep at the wheel on her way home from work.  States she had a day a few weeks ago where she had to stop a couple of times driving home from [employer].  States sleeping at 9 pm wakes up at 5 am. States for the most part she is sleeping through the night.  Does not snore, does not report any nighttime waking.  States that she saw her optometrist recently who mentioned an eye finding that he thought was consistent with hypothyroidism last TSH within normal limits but she does have a strong family history of multiple female relatives with hypothyroidism.  Plan to recheck labs.     Migraine With Aura and Without Status Migrainosus, Not Intractable    Chronic in nature.  Stable.  Patient states he is using Imitrex as needed.     Congestion of Nasal Sinus (Resolved)       Current Outpatient Medications Ordered in Epic   Medication Sig Dispense Refill    ergocalciferol (DRISDOL) 06293 UNIT capsule Take 1 Capsule by mouth every 7 days. 12 Capsule 3    SUMAtriptan (IMITREX) 50 MG Tab Take 1 Tablet by mouth one time as needed for Migraine for up to 1 dose. 10 Tablet 3    naproxen (NAPROSYN) 500 MG Tab Take 1 Tablet by mouth 2 times  daily with meals as needed (pain). 60 Tablet 1    buPROPion SR (WELLBUTRIN-SR) 100 MG TABLET SR 12 HR Take 1 Tablet by mouth every day. 90 Tablet 3     No current Epic-ordered facility-administered medications on file.       Health Maintenance: recommend scheduling pap    Review of Systems   Constitutional:  Positive for malaise/fatigue. Negative for chills and fever.   Respiratory:  Negative for cough and shortness of breath.    Cardiovascular:  Negative for chest pain, palpitations and leg swelling.   Gastrointestinal:  Negative for heartburn.   Genitourinary:  Negative for dysuria.   Neurological:  Negative for dizziness and headaches.   Psychiatric/Behavioral:  Positive for depression. Negative for suicidal ideas.        Objective:     Exam:  /68 (BP Location: Left arm, Patient Position: Sitting, BP Cuff Size: Adult)   Pulse 85   Temp (!) 35.7 °C (96.3 °F) (Temporal)   Ht 1.524 m (5') Comment: pt stated  Wt 68.9 kg (152 lb)   LMP 10/06/2022 (Approximate)   SpO2 98%   BMI 29.69 kg/m²  Body mass index is 29.69 kg/m².    Physical Exam  Constitutional:       Appearance: Normal appearance.   HENT:      Head: Normocephalic and atraumatic.   Cardiovascular:      Rate and Rhythm: Normal rate and regular rhythm.      Pulses: Normal pulses.      Heart sounds: Normal heart sounds.   Pulmonary:      Effort: Pulmonary effort is normal.      Breath sounds: Normal breath sounds.   Skin:     General: Skin is warm and dry.      Capillary Refill: Capillary refill takes less than 2 seconds.   Neurological:      General: No focal deficit present.      Mental Status: She is alert and oriented to person, place, and time.     Assessment & Plan:     37 y.o. female with the following -     Problem List Items Addressed This Visit       Chronic fatigue     - Patient to complete recommended labs.         Relevant Orders    TSH    VITAMIN D,25 HYDROXY (DEFICIENCY)    VITAMIN B12    TRIIDOTHYRONINE    Current mild episode of  major depressive disorder without prior episode (HCC)     -As other symptoms are all well controlled patient will continue Wellbutrin 100 mg daily.         Migraine with aura and without status migrainosus, not intractable     - Continue Imitrex 50 mg by mouth as needed.         Relevant Medications    SUMAtriptan (IMITREX) 50 MG Tab    naproxen (NAPROSYN) 500 MG Tab    Primary osteoarthritis of both wrists    Relevant Medications    naproxen (NAPROSYN) 500 MG Tab    Vitamin D deficiency    Relevant Medications    ergocalciferol (DRISDOL) 08150 UNIT capsule     Other Visit Diagnoses       Wellness examination        Relevant Orders    CBC WITH DIFFERENTIAL    Comp Metabolic Panel    Lipid Profile            Return in about 3 months (around 1/27/2023), or if symptoms worsen or fail to improve, for Lab Review.    I have placed the below orders and discussed them with an approved delegating provider. The MA is performing the below orders under the direction of Dr. Barth.     Please note that this dictation was created using voice recognition software. I have made every reasonable attempt to correct obvious errors, but I expect that there are errors of grammar and possibly content that I did not discover before finalizing the note.

## 2022-11-10 ENCOUNTER — OFFICE VISIT (OUTPATIENT)
Dept: MEDICAL GROUP | Facility: PHYSICIAN GROUP | Age: 37
End: 2022-11-10
Payer: COMMERCIAL

## 2022-11-10 VITALS
WEIGHT: 152 LBS | SYSTOLIC BLOOD PRESSURE: 108 MMHG | BODY MASS INDEX: 28.7 KG/M2 | HEIGHT: 61 IN | HEART RATE: 79 BPM | DIASTOLIC BLOOD PRESSURE: 70 MMHG | TEMPERATURE: 97.3 F | OXYGEN SATURATION: 98 %

## 2022-11-10 DIAGNOSIS — J06.9 UPPER RESPIRATORY TRACT INFECTION, UNSPECIFIED TYPE: ICD-10-CM

## 2022-11-10 DIAGNOSIS — J40 BRONCHITIS: ICD-10-CM

## 2022-11-10 LAB
FLUAV+FLUBV AG SPEC QL IA: NEGATIVE
INT CON NEG: NEGATIVE
INT CON POS: POSITIVE

## 2022-11-10 PROCEDURE — 99213 OFFICE O/P EST LOW 20 MIN: CPT | Performed by: NURSE PRACTITIONER

## 2022-11-10 PROCEDURE — 87804 INFLUENZA ASSAY W/OPTIC: CPT | Performed by: NURSE PRACTITIONER

## 2022-11-10 RX ORDER — AZITHROMYCIN 250 MG/1
TABLET, FILM COATED ORAL
Qty: 6 TABLET | Refills: 0 | Status: SHIPPED | OUTPATIENT
Start: 2022-11-10 | End: 2022-11-15

## 2022-11-10 RX ORDER — BENZONATATE 100 MG/1
100 CAPSULE ORAL 3 TIMES DAILY PRN
Qty: 60 CAPSULE | Refills: 0 | Status: SHIPPED | OUTPATIENT
Start: 2022-11-10 | End: 2023-02-09

## 2022-11-10 ASSESSMENT — FIBROSIS 4 INDEX: FIB4 SCORE: 0.64

## 2022-11-10 NOTE — LETTER
November 10, 2022        Marcial Gao  [address] [place] [id number]      To Whom it May Concern:    Please excuse Marcial from work 11/10/2022-11/13/2022 related to contagious illness.    If you have any questions or concerns, please don't hesitate to call.        Sincerely,        Eliel Longoria, A.P.R.N.    Electronically Signed

## 2022-11-10 NOTE — NON-PROVIDER
This is a new issue to this provider. Symptoms started 2 days ago. Reports throat dryness, throbbing headache, nasal congestion, coughing with emesis, chest tightness, body aches, and chills. Reports increased chest tightness and green, productive cough. States that she was negative for COVID-19 yesterday, received booster on 11/4. She denies fever, sore throat, shortness of breath, otalgia, and diarrhea. No sick contacts. Has been taking cough drops.   -Benzonatate for cough.   -98% on room air. Clear lung sounds with auscultation.

## 2022-11-10 NOTE — PROGRESS NOTES
Chief Complaint   Patient presents with    Other     -Pt states she tested negative for Covid last night   -PT got 4th booster Friday (11/4/22)  - Pt thinks she has bronchitis - she gets it every year around fall  - PT works for [employer] and doctor checked her vitals yesterday and thinks its pneumonia    Cough     - Two days a    Headache     - Two days ago (11/08/22) - throbbing    Chest Pain     - Started 2 days ago (11/8/22)  - Chest tightness   - This morning pt states woke up with phlegm (yellow/green)        HPI: Patient is a 37 y.o. female complaining of 2 days of illness including: productive of green sputum cough, nasal congestion, night sweats, rhinorrhea, sore throat, vomiting.   Mucus is: green.  Similarly ill exposures: no.  Treatments tried: OTC cough drops  She  reports that she has never smoked. She has never used smokeless tobacco.  Negative COVID-19 test on 11/9/2022. States that she received her booster shot on 11/4/2022.  In-office influenza test is negative.     ROS:  No fever, changes in bowel movements, or skin rash.      I reviewed the patient's medications, allergies and medical history:  Current Outpatient Medications   Medication Sig Dispense Refill    benzonatate (TESSALON) 100 MG Cap Take 1 Capsule by mouth 3 times a day as needed for Cough. 60 Capsule 0    azithromycin (ZITHROMAX) 250 MG Tab Take 2 Tablets by mouth every day for 1 day, THEN 1 Tablet every day for 4 days. Take 2 tablets (500 mg) PO on day 1 and then take 1 tablet (250 mg) daily on 2-5 6 Tablet 0    ergocalciferol (DRISDOL) 98711 UNIT capsule Take 1 Capsule by mouth every 7 days. 12 Capsule 3    SUMAtriptan (IMITREX) 50 MG Tab Take 1 Tablet by mouth one time as needed for Migraine for up to 1 dose. 10 Tablet 3    naproxen (NAPROSYN) 500 MG Tab Take 1 Tablet by mouth 2 times daily with meals as needed (pain). 60 Tablet 1    buPROPion SR (WELLBUTRIN-SR) 100 MG TABLET SR 12 HR Take 1 Tablet by mouth every day. 90 Tablet  "3     No current facility-administered medications for this visit.     Latex  Past Medical History:   Diagnosis Date    Hyperlipidemia     Osteoarthritis     Vitamin D deficiency disease         EXAM:  /70 (BP Location: Left arm, Patient Position: Sitting, BP Cuff Size: Adult)   Pulse 79   Temp 36.3 °C (97.3 °F) (Temporal)   Ht 1.549 m (5' 1\")   Wt 68.9 kg (152 lb)   SpO2 98%   General: Alert, no conversational dyspnea or audible wheeze, non-toxic appearance.  Eyes: PERRL, conjunctiva slightly injected, no eye discharge.  Ears: Normal pinnae,TM's normal bilaterally.  Nares: Patent with green mucus.  Sinuses: non tender over maxillary / frontal sinuses.  Throat: Erythematous without exudate.   Neck: Supple, with normal anterior cervical nodes.  Lungs: Clear to auscultation bilaterally, no wheeze, crackles or rhonchi.   Heart: Regular rate without murmur.  Skin: Warm and dry without rash.     ASSESSMENT:   1. Bronchitis          PLAN:  1. Patient reports history of acute bronchitis annually, states symptoms are similar to those last year. Lung sounds are clear to auscultation without crackles or consolidation. Oxygen saturation is 98% on room air.  As symptoms have been worsening over the last 2 days, azithromycin ordered.  2. Twice daily use of nasal saline rinse or Neti-Pot.  3. OTC anti-pyretics and decongestants as needed.  4.  Benzonatate ordered for cough.  5. Follow-up in office or urgent care for worsening symptoms, difficulty breathing, lack of expected recovery, or should new symptoms or problems arise.    "

## 2022-11-13 ENCOUNTER — OFFICE VISIT (OUTPATIENT)
Dept: URGENT CARE | Facility: PHYSICIAN GROUP | Age: 37
End: 2022-11-13
Payer: COMMERCIAL

## 2022-11-13 VITALS
DIASTOLIC BLOOD PRESSURE: 74 MMHG | TEMPERATURE: 98.6 F | OXYGEN SATURATION: 97 % | HEIGHT: 61 IN | SYSTOLIC BLOOD PRESSURE: 114 MMHG | BODY MASS INDEX: 28.7 KG/M2 | RESPIRATION RATE: 16 BRPM | WEIGHT: 152 LBS | HEART RATE: 98 BPM

## 2022-11-13 DIAGNOSIS — J98.8 RTI (RESPIRATORY TRACT INFECTION): ICD-10-CM

## 2022-11-13 DIAGNOSIS — J98.01 BRONCHOSPASM: ICD-10-CM

## 2022-11-13 PROCEDURE — 99214 OFFICE O/P EST MOD 30 MIN: CPT | Performed by: NURSE PRACTITIONER

## 2022-11-13 RX ORDER — ACYCLOVIR 800 MG/1
800 TABLET ORAL 2 TIMES DAILY
COMMUNITY
Start: 2022-11-08 | End: 2023-02-09

## 2022-11-13 RX ORDER — ALBUTEROL SULFATE 90 UG/1
2 AEROSOL, METERED RESPIRATORY (INHALATION) EVERY 6 HOURS PRN
Qty: 8.5 G | Refills: 0 | Status: SHIPPED | OUTPATIENT
Start: 2022-11-13 | End: 2023-02-09

## 2022-11-13 RX ORDER — PREDNISONE 10 MG/1
20 TABLET ORAL 2 TIMES DAILY
Qty: 20 TABLET | Refills: 0 | Status: SHIPPED | OUTPATIENT
Start: 2022-11-13 | End: 2022-11-18

## 2022-11-13 ASSESSMENT — FIBROSIS 4 INDEX: FIB4 SCORE: 0.64

## 2022-11-13 NOTE — LETTER
November 13, 2022         Patient: Marcial Gao   YOB: 1985   Date of Visit: 11/13/2022           To Whom it May Concern:    Marcial Gao was seen in my clinic on 11/13/2022. She may be excused from work Monday and Tuesday but can return sooner if improved.     If you have any questions or concerns, please don't hesitate to call.        Sincerely,           FREDA Duong.  Electronically Signed

## 2022-11-13 NOTE — PROGRESS NOTES
Chief Complaint   Patient presents with    Cough     With congestion and headache and states she was seen at her primary care on Thursday and states that she still not feeling better and states her congestion is worse       HISTORY OF PRESENT ILLNESS: Patient is a pleasant 37 y.o. female who presents today due to 6 days of nasal congestion, fever, chills, headache, cough, chest tightness and sinus pressure. She denies associated difficulty breathing, confusion, nausea, vomiting or diarrhea. She has tried OTC cold/sinus medication at home without much improvement.  She was seen by her PCP 3 days ago.  At that time she was placed on azithromycin and given Tessalon Perles for suspected bronchitis.  The patient has not had any recent fever but notes that she still continues to have a cough, chest tightness, congestion, and sinus pressure.  Patient denies history of asthma.      Patient Active Problem List    Diagnosis Date Noted    COVID 06/29/2022    Vitamin D deficiency 03/25/2021    Hair loss 12/31/2020    Irregular periods 09/04/2020    Anxiety 04/30/2020    Current mild episode of major depressive disorder without prior episode (HCC) 04/30/2020    Sinusitis 09/12/2018    Primary osteoarthritis of both wrists 05/25/2018    Mixed hyperlipidemia 05/25/2018    Chronic fatigue 05/25/2018    Migraine with aura and without status migrainosus, not intractable 05/25/2018       Allergies:Latex    Current Outpatient Medications Ordered in Epic   Medication Sig Dispense Refill    predniSONE (DELTASONE) 10 MG Tab Take 2 Tablets by mouth 2 times a day for 5 days. Take with food. 20 Tablet 0    albuterol 108 (90 Base) MCG/ACT Aero Soln inhalation aerosol Inhale 2 Puffs every 6 hours as needed for Shortness of Breath. 8.5 g 0    benzonatate (TESSALON) 100 MG Cap Take 1 Capsule by mouth 3 times a day as needed for Cough. 60 Capsule 0    azithromycin (ZITHROMAX) 250 MG Tab Take 2 Tablets by mouth every day for 1 day, THEN 1 Tablet  every day for 4 days. Take 2 tablets (500 mg) PO on day 1 and then take 1 tablet (250 mg) daily on 2-5 6 Tablet 0    acyclovir (ZOVIRAX) 800 MG Tab Take 1 Tablet by mouth 2 times a day. for 5 days      ergocalciferol (DRISDOL) 28498 UNIT capsule Take 1 Capsule by mouth every 7 days. 12 Capsule 3    SUMAtriptan (IMITREX) 50 MG Tab Take 1 Tablet by mouth one time as needed for Migraine for up to 1 dose. 10 Tablet 3    naproxen (NAPROSYN) 500 MG Tab Take 1 Tablet by mouth 2 times daily with meals as needed (pain). 60 Tablet 1    buPROPion SR (WELLBUTRIN-SR) 100 MG TABLET SR 12 HR Take 1 Tablet by mouth every day. 90 Tablet 3     No current Epic-ordered facility-administered medications on file.       Past Medical History:   Diagnosis Date    Hyperlipidemia     Osteoarthritis     Vitamin D deficiency disease        Social History     Tobacco Use    Smoking status: Never    Smokeless tobacco: Never   Vaping Use    Vaping Use: Never used   Substance Use Topics    Alcohol use: Yes     Comment: rarely    Drug use: Yes     Types: Marijuana, Oral     Comment: gummnies- once/twice a month per pt       Family Status   Relation Name Status    Mo  (Not Specified)    Sis  (Not Specified)     Family History   Problem Relation Age of Onset    Psychiatric Illness Mother         depression    Psychiatric Illness Sister         depression       ROS:  Review of Systems   Constitutional: Positive for fever, chills, fatigue. Negative for weight loss.   HENT: Positive for sinus pressure, sore throat, nasal congestion. Negative for ear pain, nosebleeds, neck pain.    Eyes: Negative for vision changes.   Neuro: Positive for headache. Negative for sensory changes, weakness, seizure, LOC.  Cardiovascular: Negative for chest pain, palpitations, orthopnea and leg swelling.   Respiratory: Positive for cough, sputum production, chest tightness, wheezing.   Gastrointestinal: Negative for abdominal pain, nausea, vomiting or diarrhea.    Skin:  "Negative for rash, diaphoresis.     Exam:  /74 (BP Location: Left arm, Patient Position: Sitting, BP Cuff Size: Adult)   Pulse 98   Temp 37 °C (98.6 °F) (Temporal)   Resp 16   Ht 1.549 m (5' 1\")   Wt 68.9 kg (152 lb)   SpO2 97%   General: well-nourished, well-developed female in NAD  Head: normocephalic, atraumatic  Eyes: PERRLA, no conjunctival injection, acuity grossly intact, lids normal.  Ears: normal shape and symmetry, no tenderness, no discharge. External canals are without any significant edema or erythema. Tympanic membranes are without any inflammation, no effusion. Gross auditory acuity is intact.  Nose: symmetrical without tenderness, erythema and swelling noted bilateral turbinates, clear discharge. + Maxillary sinus tenderness.   Mouth/Throat: reasonable hygiene, no exudates or tonsillar enlargement. Erythema is present.   Neck: no masses, range of motion within normal limits, no tracheal deviation. No obvious thyroid enlargement.   Lymph: no cervical adenopathy. No supraclavicular adenopathy.   Neuro: alert and oriented. Cranial nerves 1-12 grossly intact. No sensory deficit.   Cardiovascular: regular rate and rhythm. No edema.  Pulmonary: no distress. Chest is symmetrical with respiration.  Scattered wheezes.  Musculoskeletal: no clubbing, appropriate muscle tone, gait is stable.  Skin: warm, dry, intact, no clubbing, no cyanosis, no rashes.   Psych: appropriate mood, affect, judgement.         Assessment/Plan:  1. RTI (respiratory tract infection)        2. Bronchospasm  predniSONE (DELTASONE) 10 MG Tab    albuterol 108 (90 Base) MCG/ACT Aero Soln inhalation aerosol          Patient presents patient is a pleasant 37-year-old female who presents with respiratory symptoms.  She was seen by her PCP 3 days ago and placed on azithromycin, today is her third day of antibiotic therapy.  I do not think that she requires change of antibiotic therapy at this point, she has been instructed to " complete azithromycin as directed.  Will add prednisone and albuterol for wheezing. Probiotic use encouraged.  Continue Tessalon Perles as needed.  Sleep with HOB elevated, humidifier at night, rest, increase fluid intake.   Supportive care, differential diagnoses, and indications for immediate follow-up discussed with patient.   Pathogenesis of diagnosis discussed including typical length and natural progression.   Instructed to return to clinic or nearest emergency department for any change in condition, further concerns, or worsening of symptoms.  Patient states understanding of the plan of care and discharge instructions.  Instructed to make an appointment, for follow up, with her primary care provider.  Work note provided.      Please note that this dictation was created using voice recognition software. I have made every reasonable attempt to correct obvious errors, but I expect that there are errors of grammar and possibly content that I did not discover before finalizing the note. Previous clinic visit encounter reviewed and considered in medical decision making today.         FREDA Duong.

## 2023-02-09 ENCOUNTER — OFFICE VISIT (OUTPATIENT)
Dept: MEDICAL GROUP | Facility: PHYSICIAN GROUP | Age: 38
End: 2023-02-09
Payer: COMMERCIAL

## 2023-02-09 VITALS
BODY MASS INDEX: 27 KG/M2 | HEART RATE: 52 BPM | HEIGHT: 61 IN | SYSTOLIC BLOOD PRESSURE: 110 MMHG | OXYGEN SATURATION: 98 % | WEIGHT: 143 LBS | TEMPERATURE: 98.3 F | DIASTOLIC BLOOD PRESSURE: 56 MMHG

## 2023-02-09 DIAGNOSIS — R10.9 ACUTE RIGHT FLANK PAIN: ICD-10-CM

## 2023-02-09 PROCEDURE — 99213 OFFICE O/P EST LOW 20 MIN: CPT | Performed by: FAMILY MEDICINE

## 2023-02-09 RX ORDER — MELOXICAM 7.5 MG/1
7.5 TABLET ORAL DAILY
Qty: 30 TABLET | Refills: 0 | Status: SHIPPED | OUTPATIENT
Start: 2023-02-09 | End: 2023-03-09

## 2023-02-09 RX ORDER — METHOCARBAMOL 750 MG/1
750 TABLET, FILM COATED ORAL 4 TIMES DAILY
Qty: 56 TABLET | Refills: 0 | Status: SHIPPED | OUTPATIENT
Start: 2023-02-09 | End: 2023-02-23

## 2023-02-09 ASSESSMENT — FIBROSIS 4 INDEX: FIB4 SCORE: 0.64

## 2023-02-09 ASSESSMENT — PATIENT HEALTH QUESTIONNAIRE - PHQ9: CLINICAL INTERPRETATION OF PHQ2 SCORE: 0

## 2023-02-10 NOTE — PROGRESS NOTES
"Subjective:     Chief Complaint   Patient presents with    Injury     Right abdominal muscles; x couple days ago       HPI:   Marcial presents today to discuss the following.    Acute right flank pain  New problem  Started a couple of days ago while working out   Pain went away but then last night she was doing planks and pain returned now.     Straining/flexing her rectus abdominis reproduces the pain.    No bruising or bulging noted  No urinary symptoms     Has not taken any meds for now    Past Medical History:   Diagnosis Date    Hyperlipidemia     Osteoarthritis     Vitamin D deficiency disease        Current Outpatient Medications Ordered in Epic   Medication Sig Dispense Refill    methocarbamol (ROBAXIN) 750 MG Tab Take 1 Tablet by mouth 4 times a day for 14 days. 56 Tablet 0    meloxicam (MOBIC) 7.5 MG Tab Take 1 Tablet by mouth every day. 30 Tablet 0    ergocalciferol (DRISDOL) 02521 UNIT capsule Take 1 Capsule by mouth every 7 days. 12 Capsule 3    SUMAtriptan (IMITREX) 50 MG Tab Take 1 Tablet by mouth one time as needed for Migraine for up to 1 dose. 10 Tablet 3    buPROPion SR (WELLBUTRIN-SR) 100 MG TABLET SR 12 HR Take 1 Tablet by mouth every day. 90 Tablet 3     No current Epic-ordered facility-administered medications on file.       Allergies:  Latex    Health Maintenance: Completed    ROS:  Gen: no fevers/chills, no changes in weight  Eyes: no changes in vision  Pulm: no sob, no cough  CV: no chest pain, no palpitations  GI: no nausea/vomiting, no diarrhea      Objective:     Exam:  /56 (BP Location: Left arm, Patient Position: Sitting, BP Cuff Size: Adult)   Pulse (!) 52   Temp 36.8 °C (98.3 °F) (Temporal)   Ht 1.549 m (5' 1\")   Wt 64.9 kg (143 lb)   SpO2 98%   BMI 27.02 kg/m²  Body mass index is 27.02 kg/m².        Constitutional: Alert, no distress, well-groomed.  Skin: Warm, dry, good turgor, no rashes in visible areas.  Eye: Equal, round and reactive, conjunctiva clear, lids " normal.  ENMT: Lips without lesions, good dentition, moist mucous membranes.  Neck: Trachea midline, no masses, no thyromegaly.  Respiratory: Unlabored respiratory effort, no cough.  MSK: Normal gait, moves all extremities.  Tenderness to the right flank region.  No CVA tenderness.  No ecchymosis or bulging noted.  Neuro: Grossly non-focal.   Psych: Alert and oriented x3, normal affect and mood.        Assessment & Plan:     37 y.o. female with the following -     1. Acute right flank pain  New problem.  Most likely a strain from overexertion.  At this time I would like to address this conservatively with a trial of meloxicam and muscle relaxer.  She may continue with activity as tolerated.  Return precautions recommended.      No follow-ups on file.          Please note that this dictation was created using voice recognition software. I have made every reasonable attempt to correct obvious errors, but I expect that there are errors of grammar and possibly content that I did not discover before finalizing the note.

## 2023-02-10 NOTE — ASSESSMENT & PLAN NOTE
New problem  Started a couple of days ago while working out   Pain went away but then last night she was doing planks and pain returned now.     Straining/flexing her rectus abdominis reproduces the pain.    No bruising or bulging noted  No urinary symptoms     Has not taken any meds for now

## 2023-03-09 RX ORDER — MELOXICAM 7.5 MG/1
7.5 TABLET ORAL DAILY
Qty: 30 TABLET | Refills: 0 | Status: SHIPPED | OUTPATIENT
Start: 2023-03-09 | End: 2023-04-25

## 2023-04-25 ENCOUNTER — TELEMEDICINE (OUTPATIENT)
Dept: MEDICAL GROUP | Facility: PHYSICIAN GROUP | Age: 38
End: 2023-04-25
Payer: COMMERCIAL

## 2023-04-25 DIAGNOSIS — N89.8 VAGINAL IRRITATION: ICD-10-CM

## 2023-04-25 PROCEDURE — 99213 OFFICE O/P EST LOW 20 MIN: CPT | Mod: 95

## 2023-04-25 RX ORDER — METRONIDAZOLE 500 MG/1
500 TABLET ORAL 2 TIMES DAILY
Qty: 14 TABLET | Refills: 0 | Status: SHIPPED | OUTPATIENT
Start: 2023-04-25 | End: 2023-09-07

## 2023-04-25 ASSESSMENT — ENCOUNTER SYMPTOMS
DIZZINESS: 0
FEVER: 0
WEAKNESS: 0
CONSTIPATION: 0
ABDOMINAL PAIN: 0
HEADACHES: 0
MYALGIAS: 0
BLURRED VISION: 0
NAUSEA: 0
VOMITING: 0
DIARRHEA: 0
SHORTNESS OF BREATH: 0
COUGH: 0
CHILLS: 0
WEIGHT LOSS: 0

## 2023-04-25 NOTE — PROGRESS NOTES
Virtual Visit: Established Patient   This visit was conducted via Zoom using secure and encrypted videoconferencing technology.   The patient was in their home in the state [facility].    The patient's identity was confirmed and verbal consent was obtained for this virtual visit.    Subjective:   CC:   Chief Complaint   Patient presents with    Other     Antibiotics, bacterial vaginosis, burning when urinating     Marcial Gao is a 37 y.o. female presenting for evaluation and management of:    Reports vaginal irritation after intercourse with monogamous partner.  Reports dysuria and odorous creamy vaginal discharge. Denies pruritis, urgency, or frequency of urination. Intimate 2 weeks ago without issue.  Reports BV in the past and felt similar.     ROS   Review of Systems   Constitutional:  Negative for chills, fever, malaise/fatigue and weight loss.   Eyes:  Negative for blurred vision.   Respiratory:  Negative for cough and shortness of breath.    Cardiovascular:  Negative for chest pain.   Gastrointestinal:  Negative for abdominal pain, constipation, diarrhea, nausea and vomiting.   Genitourinary:  Positive for dysuria. Negative for frequency and urgency.   Musculoskeletal:  Negative for myalgias.   Neurological:  Negative for dizziness, weakness and headaches.       Current medicines (including changes today)  Current Outpatient Medications   Medication Sig Dispense Refill    metroNIDAZOLE (FLAGYL) 500 MG Tab Take 1 Tablet by mouth 2 times a day. 14 Tablet 0    buPROPion SR (WELLBUTRIN-SR) 100 MG TABLET SR 12 HR TAKE 1 TABLET BY MOUTH EVERY DAY 90 Tablet 3    ergocalciferol (DRISDOL) 19997 UNIT capsule Take 1 Capsule by mouth every 7 days. 12 Capsule 3    SUMAtriptan (IMITREX) 50 MG Tab Take 1 Tablet by mouth one time as needed for Migraine for up to 1 dose. 10 Tablet 3     No current facility-administered medications for this visit.       Patient Active Problem List    Diagnosis Date Noted    Vaginal  irritation 04/25/2023    Acute right flank pain 02/09/2023    COVID 06/29/2022    Vitamin D deficiency 03/25/2021    Hair loss 12/31/2020    Irregular periods 09/04/2020    Anxiety 04/30/2020    Current mild episode of major depressive disorder without prior episode (HCC) 04/30/2020    Sinusitis 09/12/2018    Primary osteoarthritis of both wrists 05/25/2018    Mixed hyperlipidemia 05/25/2018    Chronic fatigue 05/25/2018    Migraine with aura and without status migrainosus, not intractable 05/25/2018        Objective:   There were no vitals taken for this visit.    Physical Exam:  Constitutional: Alert, no distress, well-groomed.  Skin: No rashes in visible areas.  Eye: Round. Conjunctiva clear, lids normal. No icterus.   ENMT: Lips pink without lesions, good dentition, moist mucous membranes. Phonation normal.  Neck: No masses, no thyromegaly. Moves freely without pain.  Respiratory: Unlabored respiratory effort, no cough or audible wheeze  Psych: Alert and oriented x3, normal affect and mood.     Assessment and Plan:   The following treatment plan was discussed:     1. Vaginal irritation  - metroNIDAZOLE (FLAGYL) 500 MG Tab; Take 1 Tablet by mouth 2 times a day.  Dispense: 14 Tablet; Refill: 0  - URINALYSIS,CULTURE IF INDICATED; Future      Follow-up: Return if symptoms worsen or fail to improve.

## 2023-09-07 ENCOUNTER — OFFICE VISIT (OUTPATIENT)
Dept: URGENT CARE | Facility: PHYSICIAN GROUP | Age: 38
End: 2023-09-07
Payer: COMMERCIAL

## 2023-09-07 VITALS
BODY MASS INDEX: 28.7 KG/M2 | HEART RATE: 82 BPM | HEIGHT: 61 IN | TEMPERATURE: 97.4 F | OXYGEN SATURATION: 98 % | WEIGHT: 152 LBS | RESPIRATION RATE: 18 BRPM | SYSTOLIC BLOOD PRESSURE: 110 MMHG | DIASTOLIC BLOOD PRESSURE: 68 MMHG

## 2023-09-07 DIAGNOSIS — R19.7 DIARRHEA, UNSPECIFIED TYPE: ICD-10-CM

## 2023-09-07 PROCEDURE — 3074F SYST BP LT 130 MM HG: CPT | Performed by: PHYSICIAN ASSISTANT

## 2023-09-07 PROCEDURE — 3078F DIAST BP <80 MM HG: CPT | Performed by: PHYSICIAN ASSISTANT

## 2023-09-07 PROCEDURE — 99214 OFFICE O/P EST MOD 30 MIN: CPT | Performed by: PHYSICIAN ASSISTANT

## 2023-09-07 ASSESSMENT — ENCOUNTER SYMPTOMS
NAUSEA: 0
VOMITING: 0
BLOOD IN STOOL: 0
FEVER: 0
ABDOMINAL PAIN: 1
FLATUS: 0
CHILLS: 0
CONSTIPATION: 0
DIARRHEA: 1

## 2023-09-07 ASSESSMENT — FIBROSIS 4 INDEX: FIB4 SCORE: 0.66

## 2023-09-08 ENCOUNTER — HOSPITAL ENCOUNTER (OUTPATIENT)
Facility: MEDICAL CENTER | Age: 38
End: 2023-09-08
Attending: PHYSICIAN ASSISTANT
Payer: COMMERCIAL

## 2023-09-08 DIAGNOSIS — R19.7 DIARRHEA, UNSPECIFIED TYPE: ICD-10-CM

## 2023-09-08 LAB — H PYLORI AG STL QL IA: NOT DETECTED

## 2023-09-08 PROCEDURE — 87899 AGENT NOS ASSAY W/OPTIC: CPT

## 2023-09-08 PROCEDURE — 87177 OVA AND PARASITES SMEARS: CPT

## 2023-09-08 PROCEDURE — 87209 SMEAR COMPLEX STAIN: CPT

## 2023-09-08 PROCEDURE — 87338 HPYLORI STOOL AG IA: CPT

## 2023-09-08 PROCEDURE — 87077 CULTURE AEROBIC IDENTIFY: CPT

## 2023-09-08 PROCEDURE — 87046 STOOL CULTR AEROBIC BACT EA: CPT

## 2023-09-08 PROCEDURE — 87045 FECES CULTURE AEROBIC BACT: CPT

## 2023-09-08 NOTE — PROGRESS NOTES
Subjective:   Marcial Gao is a 38 y.o. female who presents today with   Chief Complaint   Patient presents with    Abdominal Pain     X5 days Abdominal cramping, ongoing diarrhea, fever, dehydrated, fatigue, pt traveled to [place]     Diarrhea   This is a new problem. Episode onset: 6 days. The problem occurs 5 to 10 times per day. The problem has been unchanged. The stool consistency is described as Watery and mucous. The patient states that diarrhea does not awaken her from sleep. Associated symptoms include abdominal pain. Pertinent negatives include no chills, fever, increased  flatus or vomiting. She has tried increased fluids (BRAT diet) for the symptoms. The treatment provided mild relief.     Patient states she recently traveled to [place].  She states her symptoms started on Friday while she was in [place] and then she returned from [place] on Monday.  She states she has been having persistent diarrhea throughout the day is worse anytime she eats.  She states she has been trying to keep down fluids and eat a bland diet and replenish fluids but she still feels dehydrated.  Patient states she has had antibiotics in the last month.  Patient states she has had temperature up to 101.  No blood in her stool.  Patient states nobody else in her party got sick but she did eat from a shrimp ceviche stand that nobody also had and also had more seafood than other people.  Patient states she was feeling somewhat better today but then had a drink of coffee and diarrhea seem to be triggered again.    PMH:  has a past medical history of Hyperlipidemia, Osteoarthritis, and Vitamin D deficiency disease.  MEDS:   Current Outpatient Medications:     buPROPion SR (WELLBUTRIN-SR) 100 MG TABLET SR 12 HR, TAKE 1 TABLET BY MOUTH EVERY DAY, Disp: 90 Tablet, Rfl: 3    SUMAtriptan (IMITREX) 50 MG Tab, Take 1 Tablet by mouth one time as needed for Migraine for up to 1 dose., Disp: 10 Tablet, Rfl: 3    ergocalciferol  "(DRISDOL) 64702 UNIT capsule, Take 1 Capsule by mouth every 7 days. (Patient not taking: Reported on 9/7/2023), Disp: 12 Capsule, Rfl: 3  ALLERGIES:   Allergies   Allergen Reactions    Latex Hives     SURGHX: History reviewed. No pertinent surgical history.  SOCHX:  reports that she has never smoked. She has never used smokeless tobacco. She reports current alcohol use. She reports that she does not currently use drugs after having used the following drugs: Marijuana and Oral.  FH: Reviewed with patient, not pertinent to this visit.       Review of Systems   Constitutional:  Negative for chills and fever.   Gastrointestinal:  Positive for abdominal pain and diarrhea. Negative for blood in stool, constipation, flatus, melena, nausea and vomiting.        Objective:   /68 (BP Location: Left arm, Patient Position: Sitting, BP Cuff Size: Adult)   Pulse 82   Temp 36.3 °C (97.4 °F) (Temporal)   Resp 18   Ht 1.549 m (5' 1\")   Wt 68.9 kg (152 lb)   SpO2 98%   BMI 28.72 kg/m²   Physical Exam  Vitals and nursing note reviewed.   Constitutional:       General: She is not in acute distress.     Appearance: Normal appearance. She is well-developed. She is not ill-appearing or toxic-appearing.   HENT:      Head: Normocephalic and atraumatic.      Right Ear: Hearing normal.      Left Ear: Hearing normal.   Cardiovascular:      Rate and Rhythm: Normal rate.   Pulmonary:      Effort: Pulmonary effort is normal.   Abdominal:      General: Bowel sounds are increased. There is no distension.      Palpations: Abdomen is soft.      Tenderness: There is generalized abdominal tenderness. There is no right CVA tenderness, left CVA tenderness, guarding or rebound. Negative signs include Garduno's sign.   Musculoskeletal:      Comments: Normal movement in all 4 extremities   Skin:     General: Skin is warm and dry.   Neurological:      Mental Status: She is alert.      Coordination: Coordination normal.   Psychiatric:         Mood " and Affect: Mood normal.       Assessment/Plan:   Assessment    1. Diarrhea, unspecified type  - CULTURE STOOL; Future  - C Diff by PCR rflx Toxin; Future  - Complete O&P; Future  - H.PYLORI STOOL ANTIGEN; Future    Recommend following up with stool sample and treating accordingly based on those results.  Discussed with patient that if her diarrhea continues over the weekend I recommend considering going to the ER for IV rehydration.  So far she is still able to keep fluids down and has been doing well with doing this.  Would suggest continuing with brat diet and replenishing fluids.    Differential diagnosis, natural history, supportive care, and indications for immediate follow-up discussed.   Patient given instructions and understanding of medications and treatment.    If not improving in 3-5 days, F/U with PCP or return to UC if symptoms worsen.    Patient agreeable to plan.      Please note that this dictation was created using voice recognition software. I have made every reasonable attempt to correct obvious errors, but I expect that there are errors of grammar and possibly content that I did not discover before finalizing the note.    Skinny Dial PA-C

## 2023-09-09 LAB
E COLI SXT1+2 STL IA: NORMAL
SIGNIFICANT IND 70042: NORMAL
SITE SITE: NORMAL
SOURCE SOURCE: NORMAL

## 2023-09-11 LAB
BACTERIA STL CULT: NORMAL
E COLI SXT1+2 STL IA: NORMAL
SIGNIFICANT IND 70042: NORMAL
SITE SITE: NORMAL
SOURCE SOURCE: NORMAL

## 2023-09-15 LAB — OVA AND PARASITE, FECAL INTERPRETATION Q0595: NEGATIVE

## 2023-09-18 ENCOUNTER — HOSPITAL ENCOUNTER (OUTPATIENT)
Dept: LAB | Facility: MEDICAL CENTER | Age: 38
End: 2023-09-18
Attending: NURSE PRACTITIONER
Payer: COMMERCIAL

## 2023-09-18 ENCOUNTER — OFFICE VISIT (OUTPATIENT)
Dept: MEDICAL GROUP | Facility: PHYSICIAN GROUP | Age: 38
End: 2023-09-18
Payer: COMMERCIAL

## 2023-09-18 VITALS
BODY MASS INDEX: 28.17 KG/M2 | TEMPERATURE: 98.1 F | HEIGHT: 61 IN | WEIGHT: 149.2 LBS | DIASTOLIC BLOOD PRESSURE: 60 MMHG | SYSTOLIC BLOOD PRESSURE: 98 MMHG | HEART RATE: 89 BPM | OXYGEN SATURATION: 94 %

## 2023-09-18 DIAGNOSIS — R19.7 DIARRHEA OF PRESUMED INFECTIOUS ORIGIN: ICD-10-CM

## 2023-09-18 DIAGNOSIS — R10.12 ACUTE LUQ PAIN: ICD-10-CM

## 2023-09-18 DIAGNOSIS — F41.9 ANXIETY: ICD-10-CM

## 2023-09-18 DIAGNOSIS — R19.7 DIARRHEA, UNSPECIFIED TYPE: ICD-10-CM

## 2023-09-18 DIAGNOSIS — E78.2 MIXED HYPERLIPIDEMIA: ICD-10-CM

## 2023-09-18 LAB
ALBUMIN SERPL BCP-MCNC: 4.2 G/DL (ref 3.2–4.9)
ALBUMIN/GLOB SERPL: 1.4 G/DL
ALP SERPL-CCNC: 73 U/L (ref 30–99)
ALT SERPL-CCNC: 12 U/L (ref 2–50)
AMYLASE SERPL-CCNC: 83 U/L (ref 20–103)
ANION GAP SERPL CALC-SCNC: 10 MMOL/L (ref 7–16)
AST SERPL-CCNC: 15 U/L (ref 12–45)
BILIRUB SERPL-MCNC: 0.6 MG/DL (ref 0.1–1.5)
BUN SERPL-MCNC: 12 MG/DL (ref 8–22)
CALCIUM ALBUM COR SERPL-MCNC: 8.4 MG/DL (ref 8.5–10.5)
CALCIUM SERPL-MCNC: 8.6 MG/DL (ref 8.5–10.5)
CHLORIDE SERPL-SCNC: 107 MMOL/L (ref 96–112)
CO2 SERPL-SCNC: 23 MMOL/L (ref 20–33)
CREAT SERPL-MCNC: 0.88 MG/DL (ref 0.5–1.4)
GFR SERPLBLD CREATININE-BSD FMLA CKD-EPI: 85 ML/MIN/1.73 M 2
GLOBULIN SER CALC-MCNC: 3 G/DL (ref 1.9–3.5)
GLUCOSE SERPL-MCNC: 84 MG/DL (ref 65–99)
LIPASE SERPL-CCNC: 33 U/L (ref 11–82)
POTASSIUM SERPL-SCNC: 4 MMOL/L (ref 3.6–5.5)
PROT SERPL-MCNC: 7.2 G/DL (ref 6–8.2)
SODIUM SERPL-SCNC: 140 MMOL/L (ref 135–145)

## 2023-09-18 PROCEDURE — 80053 COMPREHEN METABOLIC PANEL: CPT

## 2023-09-18 PROCEDURE — 3078F DIAST BP <80 MM HG: CPT | Performed by: NURSE PRACTITIONER

## 2023-09-18 PROCEDURE — 3074F SYST BP LT 130 MM HG: CPT | Performed by: NURSE PRACTITIONER

## 2023-09-18 PROCEDURE — 83690 ASSAY OF LIPASE: CPT

## 2023-09-18 PROCEDURE — 36415 COLL VENOUS BLD VENIPUNCTURE: CPT

## 2023-09-18 PROCEDURE — 82150 ASSAY OF AMYLASE: CPT

## 2023-09-18 PROCEDURE — 99214 OFFICE O/P EST MOD 30 MIN: CPT | Performed by: NURSE PRACTITIONER

## 2023-09-18 ASSESSMENT — FIBROSIS 4 INDEX: FIB4 SCORE: 0.66

## 2023-09-18 ASSESSMENT — ENCOUNTER SYMPTOMS
HEADACHES: 0
ABDOMINAL PAIN: 1
PALPITATIONS: 0
NAUSEA: 0
SHORTNESS OF BREATH: 0
COUGH: 0
BLOOD IN STOOL: 0
CONSTIPATION: 0
CHILLS: 0
FEVER: 0
DIZZINESS: 0
VOMITING: 0
DIARRHEA: 1

## 2023-09-18 NOTE — ASSESSMENT & PLAN NOTE
Discussed different dosing and SR versus XL. Agreeable to Wellbutrin 100 mg BID. Advised her to notify provider if not effective or noticing any side effects with increased dosing.

## 2023-09-18 NOTE — PROGRESS NOTES
Subjective:     Chief Complaint   Patient presents with    Referral Needed     gastro    Medication Management     Wellbutrin        HPI:   Marcial presents today with     Problem   Diarrhea    New issue to this provider.  She recently traveled to [place] beginning of September. She began to have cramping with frequent foul smelling, yellow in color diarrhea  associated with seafood.  Returning home, she developed a fever for 3 days and went to urgent care where stool studies were completed which were negative.  At that time she tried the BRAT diet, increased fluids, and began a probiotic. Today she reports the diarrhea has not resolved and is now associated with any type of food and whenever she eats. Denies blood in stool, nausea or vomiting. She does have intermittent abdominal cramping and pain radiating to her left shoulder.       Anxiety    Chronic in nature. She is currently taking Wellbutrin 100 mg daily as prescribed and is working well with no issues. She does notice that the effects of medication wear off after work around 5 PM.          Current Outpatient Medications Ordered in Epic   Medication Sig Dispense Refill    buPROPion SR (WELLBUTRIN-SR) 100 MG TABLET SR 12 HR TAKE 1 TABLET BY MOUTH EVERY DAY 90 Tablet 3    SUMAtriptan (IMITREX) 50 MG Tab Take 1 Tablet by mouth one time as needed for Migraine for up to 1 dose. 10 Tablet 3    ergocalciferol (DRISDOL) 75893 UNIT capsule Take 1 Capsule by mouth every 7 days. (Patient not taking: Reported on 9/7/2023) 12 Capsule 3     No current [facility]-ordered facility-administered medications on file.       Health Maintenance:     Review of Systems   Constitutional:  Negative for chills and fever.   Respiratory:  Negative for cough and shortness of breath.    Cardiovascular:  Negative for chest pain and palpitations.   Gastrointestinal:  Positive for abdominal pain and diarrhea. Negative for blood in stool, constipation, nausea and vomiting.   Genitourinary:   "Negative for dysuria, frequency and urgency.   Neurological:  Negative for dizziness and headaches.         Objective:     Exam:  BP 98/60 (BP Location: Left arm, Patient Position: Sitting, BP Cuff Size: Adult)   Pulse 89   Temp 36.7 °C (98.1 °F) (Temporal)   Ht 1.549 m (5' 1\")   Wt 67.7 kg (149 lb 3.2 oz)   SpO2 94%   BMI 28.19 kg/m²  Body mass index is 28.19 kg/m².    Physical Exam  Constitutional:       Appearance: Normal appearance.   Cardiovascular:      Rate and Rhythm: Normal rate and regular rhythm.      Pulses: Normal pulses.      Heart sounds: Normal heart sounds.   Pulmonary:      Effort: Pulmonary effort is normal.      Breath sounds: Normal breath sounds.   Abdominal:      General: Abdomen is flat. Bowel sounds are normal. There is no distension.      Palpations: Abdomen is soft.      Tenderness: There is abdominal tenderness in the left upper quadrant.          Comments: Palpable left sided tenderness    Skin:     General: Skin is warm and dry.   Neurological:      Mental Status: She is alert.       Assessment & Plan:     38 y.o. female with the following -     Problem List Items Addressed This Visit       Anxiety     Discussed different dosing and SR versus XL. Agreeable to Wellbutrin 100 mg BID. Advised her to notify provider if not effective or noticing any side effects with increased dosing.          Diarrhea     Will order a  fecal calprotectin, CMP, Lipase, Amylase, complete abdominal ultrasound, and referral to GI.             Relevant Orders    CALPROTECTIN,FECAL    Referral to Gastroenterology    Mixed hyperlipidemia    Relevant Orders    Comp Metabolic Panel     Other Visit Diagnoses       Acute LUQ pain        Relevant Orders    LIPASE    AMYLASE    US-ABDOMEN COMPLETE SURVEY    Referral to Gastroenterology    Comp Metabolic Panel            I spent a total of 30 minutes with record review, exam, communication with the patient, communication with other providers, and documentation of " this encounter.      No follow-ups on file.      Please note that this dictation was created using voice recognition software. I have made every reasonable attempt to correct obvious errors, but I expect that there are errors of grammar and possibly content that I did not discover before finalizing the note.

## 2023-09-18 NOTE — ASSESSMENT & PLAN NOTE
Will order a  fecal calprotectin, CMP, Lipase, Amylase, complete abdominal ultrasound, and referral to GI.

## 2023-09-19 ENCOUNTER — PATIENT MESSAGE (OUTPATIENT)
Dept: MEDICAL GROUP | Facility: PHYSICIAN GROUP | Age: 38
End: 2023-09-19
Payer: COMMERCIAL

## 2023-09-19 ENCOUNTER — HOSPITAL ENCOUNTER (OUTPATIENT)
Dept: RADIOLOGY | Facility: MEDICAL CENTER | Age: 38
End: 2023-09-19
Attending: NURSE PRACTITIONER
Payer: COMMERCIAL

## 2023-09-19 DIAGNOSIS — R10.12 ACUTE LUQ PAIN: ICD-10-CM

## 2023-09-19 DIAGNOSIS — K80.20 GALLBLADDER COLIC: ICD-10-CM

## 2023-09-19 PROCEDURE — 76700 US EXAM ABDOM COMPLETE: CPT

## 2023-10-06 ENCOUNTER — APPOINTMENT (OUTPATIENT)
Dept: RADIOLOGY | Facility: MEDICAL CENTER | Age: 38
End: 2023-10-06
Attending: NURSE PRACTITIONER
Payer: COMMERCIAL

## 2023-12-01 ENCOUNTER — APPOINTMENT (OUTPATIENT)
Dept: RADIOLOGY | Facility: MEDICAL CENTER | Age: 38
End: 2023-12-01
Attending: NURSE PRACTITIONER
Payer: COMMERCIAL

## 2023-12-06 ENCOUNTER — OFFICE VISIT (OUTPATIENT)
Dept: URGENT CARE | Facility: PHYSICIAN GROUP | Age: 38
End: 2023-12-06
Payer: COMMERCIAL

## 2023-12-06 VITALS
HEART RATE: 108 BPM | WEIGHT: 147.71 LBS | TEMPERATURE: 99.3 F | DIASTOLIC BLOOD PRESSURE: 64 MMHG | BODY MASS INDEX: 29 KG/M2 | OXYGEN SATURATION: 98 % | HEIGHT: 60 IN | SYSTOLIC BLOOD PRESSURE: 112 MMHG | RESPIRATION RATE: 16 BRPM

## 2023-12-06 DIAGNOSIS — G43.109 MIGRAINE WITH AURA AND WITHOUT STATUS MIGRAINOSUS, NOT INTRACTABLE: ICD-10-CM

## 2023-12-06 DIAGNOSIS — R00.0 TACHYCARDIA: ICD-10-CM

## 2023-12-06 DIAGNOSIS — J02.9 SORE THROAT: ICD-10-CM

## 2023-12-06 DIAGNOSIS — J06.9 VIRAL URI WITH COUGH: ICD-10-CM

## 2023-12-06 DIAGNOSIS — R06.2 SYMPTOM OF WHEEZING: ICD-10-CM

## 2023-12-06 DIAGNOSIS — E55.9 VITAMIN D DEFICIENCY: ICD-10-CM

## 2023-12-06 LAB
FLUAV RNA SPEC QL NAA+PROBE: NEGATIVE
FLUBV RNA SPEC QL NAA+PROBE: NEGATIVE
RSV RNA SPEC QL NAA+PROBE: NEGATIVE
S PYO DNA SPEC NAA+PROBE: NOT DETECTED
SARS-COV-2 RNA RESP QL NAA+PROBE: NEGATIVE

## 2023-12-06 PROCEDURE — 99214 OFFICE O/P EST MOD 30 MIN: CPT | Performed by: STUDENT IN AN ORGANIZED HEALTH CARE EDUCATION/TRAINING PROGRAM

## 2023-12-06 PROCEDURE — 3074F SYST BP LT 130 MM HG: CPT | Performed by: STUDENT IN AN ORGANIZED HEALTH CARE EDUCATION/TRAINING PROGRAM

## 2023-12-06 PROCEDURE — 87651 STREP A DNA AMP PROBE: CPT | Performed by: STUDENT IN AN ORGANIZED HEALTH CARE EDUCATION/TRAINING PROGRAM

## 2023-12-06 PROCEDURE — 3078F DIAST BP <80 MM HG: CPT | Performed by: STUDENT IN AN ORGANIZED HEALTH CARE EDUCATION/TRAINING PROGRAM

## 2023-12-06 PROCEDURE — 0241U POCT CEPHEID COV-2, FLU A/B, RSV - PCR: CPT | Performed by: STUDENT IN AN ORGANIZED HEALTH CARE EDUCATION/TRAINING PROGRAM

## 2023-12-06 RX ORDER — ALBUTEROL SULFATE 90 UG/1
2 AEROSOL, METERED RESPIRATORY (INHALATION) EVERY 6 HOURS PRN
Qty: 8.5 G | Refills: 0 | Status: SHIPPED | OUTPATIENT
Start: 2023-12-06

## 2023-12-06 ASSESSMENT — ENCOUNTER SYMPTOMS
PALPITATIONS: 0
NAUSEA: 0
COUGH: 1
DIARRHEA: 0
CHILLS: 1
SHORTNESS OF BREATH: 0
FEVER: 1
ABDOMINAL PAIN: 0
CONSTIPATION: 0
SORE THROAT: 1
VOMITING: 0
WHEEZING: 0

## 2023-12-06 ASSESSMENT — FIBROSIS 4 INDEX: FIB4 SCORE: 0.69

## 2023-12-06 NOTE — LETTER
December 6, 2023    To Whom It May Concern:         This is confirmation that Marcial Gao attended her scheduled appointment with Ila Zurita P.A.-C. on 12/06/23. Please excuse work absences yesterday (12/5/23) through 12/8/23 for medical reasons. Marcial can return to work without restrictions on 12/9/23 or earlier as long as symptoms have improved/resolved and there has been no fever for 24 hours.         If you have any questions please do not hesitate to call me at the phone number listed below.    Sincerely,    Ila Zurita P.A.-C.  [phone]

## 2023-12-06 NOTE — LETTER
December 6, 2023    To Whom It May Concern:         This is confirmation that Marcial Gao attended her scheduled appointment with Ila Zurita P.A.-C. on 12/06/23.      A concern for COVID-19/FLU has been identified and testing is in progress. We are asking you to excuse absences (including yesterday and today) while following self-isolation protocol per Center for Disease Control (CDC) guidelines.  Your employee will be able to access test results through our electronic delivery system called [email or web address].    If the results of testing are negative, and once there has been no fever (temperature >100.4 F) for at least 24 hours without treatment, then return to work is approved. If the results of testing are positive then please excuse absenses per CDC guidelines. In general, repeat testing is not necessary and not offered through our [facility].    Sincerely,  Ila Zurita P.A.-C.  [phone]

## 2023-12-06 NOTE — PROGRESS NOTES
Subjective     Marcial Gao is a 38 y.o. female who presents with Sore Throat (X 3 days and was getting worse yesterday with a lot of congestion, SOB, weak, chest tightness, sinus pressure and low grade fever last night)            URI   This is a new problem. Episode onset: 2 days ago. The problem has been unchanged. Associated symptoms include congestion, coughing and a sore throat. Pertinent negatives include no abdominal pain, chest pain, diarrhea, ear pain, nausea, vomiting or wheezing.       Review of Systems   Constitutional:  Positive for chills, fever and malaise/fatigue.   HENT:  Positive for congestion and sore throat. Negative for ear pain.    Respiratory:  Positive for cough. Negative for shortness of breath and wheezing.    Cardiovascular:  Negative for chest pain and palpitations.   Gastrointestinal:  Negative for abdominal pain, constipation, diarrhea, nausea and vomiting.   All other systems reviewed and are negative.             Objective     /64 (BP Location: Left arm, Patient Position: Sitting, BP Cuff Size: Adult)   Pulse (!) 108   Temp 37.4 °C (99.3 °F) (Temporal)   Resp 16   Ht 1.524 m (5')   Wt 67 kg (147 lb 11.3 oz)   SpO2 98%   BMI 28.85 kg/m²      Physical Exam  Vitals reviewed.   Constitutional:       General: She is not in acute distress.     Appearance: Normal appearance. She is ill-appearing. She is not toxic-appearing.   HENT:      Head: Normocephalic and atraumatic.      Right Ear: Tympanic membrane, ear canal and external ear normal.      Left Ear: Tympanic membrane, ear canal and external ear normal.      Nose: Nose normal.      Mouth/Throat:      Mouth: Mucous membranes are moist.      Pharynx: Oropharynx is clear. Posterior oropharyngeal erythema present.   Eyes:      Extraocular Movements: Extraocular movements intact.      Conjunctiva/sclera: Conjunctivae normal.      Pupils: Pupils are equal, round, and reactive to light.   Cardiovascular:      Rate  and Rhythm: Normal rate and regular rhythm.   Pulmonary:      Effort: Pulmonary effort is normal. No respiratory distress.      Breath sounds: Normal breath sounds. No stridor. No wheezing, rhonchi or rales.   Skin:     General: Skin is warm and dry.   Neurological:      General: No focal deficit present.      Mental Status: She is alert. Mental status is at baseline.                             Assessment & Plan        1. Viral URI with cough  - POCT CoV-2, Flu A/B, RSV by PCR    2. Sore throat  - POCT CEPHEID GROUP A STREP - PCR    3. Tachycardia  - Asymptomatic.  Secondary to systemic infection.    4. Symptom of wheezing  - Pulmonary exam revealed normal effort and breath sounds bilaterally without wheezes, rales, rhonchi.  SPO2 98% on room air.  - albuterol 108 (90 Base) MCG/ACT Aero Soln inhalation aerosol; Inhale 2 Puffs every 6 hours as needed for Shortness of Breath.  Dispense: 8.5 g; Refill: 0     Day #3 of symptoms. Most likely viral in etiology.  Patient requesting zpak. Education on antibiotic stewardship.    Differential diagnoses, supportive care measures (rest, hydration, OTC cold/flu medication, nasal saline rinses, warm salt water gargles, humidified air) and indications for immediate follow-up discussed with patient. Pathogenesis of diagnosis discussed including typical length and natural progression.      Instructed to return to urgent care or nearest emergency department if symptoms fail to improve, for any change in condition, further concerns, or new concerning symptoms.    Patient states understanding and agrees with the plan of care and discharge instructions.

## 2023-12-07 RX ORDER — SUMATRIPTAN 50 MG/1
50 TABLET, FILM COATED ORAL
Qty: 10 TABLET | Refills: 3 | Status: SHIPPED | OUTPATIENT
Start: 2023-12-07 | End: 2024-02-29 | Stop reason: SDUPTHER

## 2023-12-07 RX ORDER — ERGOCALCIFEROL 1.25 MG/1
50000 CAPSULE ORAL
Qty: 12 CAPSULE | Refills: 3 | Status: SHIPPED | OUTPATIENT
Start: 2023-12-07 | End: 2024-02-29 | Stop reason: SDUPTHER

## 2023-12-29 ENCOUNTER — HOSPITAL ENCOUNTER (OUTPATIENT)
Dept: RADIOLOGY | Facility: MEDICAL CENTER | Age: 38
End: 2023-12-29
Attending: OBSTETRICS & GYNECOLOGY
Payer: COMMERCIAL

## 2023-12-29 DIAGNOSIS — Z12.31 VISIT FOR SCREENING MAMMOGRAM: ICD-10-CM

## 2023-12-29 PROCEDURE — 77063 BREAST TOMOSYNTHESIS BI: CPT

## 2024-01-11 DIAGNOSIS — F41.9 ANXIETY: ICD-10-CM

## 2024-01-11 RX ORDER — BUPROPION HYDROCHLORIDE 100 MG/1
100 TABLET, EXTENDED RELEASE ORAL
Qty: 90 TABLET | Refills: 0 | Status: SHIPPED | OUTPATIENT
Start: 2024-01-11 | End: 2024-02-29 | Stop reason: SDUPTHER

## 2024-02-29 DIAGNOSIS — E55.9 VITAMIN D DEFICIENCY: ICD-10-CM

## 2024-02-29 DIAGNOSIS — F41.9 ANXIETY: ICD-10-CM

## 2024-02-29 DIAGNOSIS — G43.109 MIGRAINE WITH AURA AND WITHOUT STATUS MIGRAINOSUS, NOT INTRACTABLE: ICD-10-CM

## 2024-02-29 RX ORDER — BUPROPION HYDROCHLORIDE 100 MG/1
100 TABLET, EXTENDED RELEASE ORAL
Qty: 90 TABLET | Refills: 0 | Status: SHIPPED | OUTPATIENT
Start: 2024-02-29

## 2024-02-29 RX ORDER — ERGOCALCIFEROL 1.25 MG/1
50000 CAPSULE ORAL
Qty: 12 CAPSULE | Refills: 0 | Status: SHIPPED | OUTPATIENT
Start: 2024-02-29

## 2024-02-29 RX ORDER — SUMATRIPTAN 50 MG/1
50 TABLET, FILM COATED ORAL
Qty: 30 TABLET | Refills: 0 | Status: SHIPPED | OUTPATIENT
Start: 2024-02-29

## 2024-06-23 DIAGNOSIS — F41.9 ANXIETY: ICD-10-CM

## 2024-06-23 DIAGNOSIS — G43.109 MIGRAINE WITH AURA AND WITHOUT STATUS MIGRAINOSUS, NOT INTRACTABLE: ICD-10-CM

## 2024-06-24 RX ORDER — SUMATRIPTAN 50 MG/1
TABLET, FILM COATED ORAL
Qty: 27 TABLET | Refills: 8 | Status: SHIPPED | OUTPATIENT
Start: 2024-06-24

## 2024-06-24 RX ORDER — BUPROPION HYDROCHLORIDE 100 MG/1
100 TABLET, EXTENDED RELEASE ORAL DAILY
Qty: 90 TABLET | Refills: 3 | Status: SHIPPED | OUTPATIENT
Start: 2024-06-24

## 2024-06-24 NOTE — TELEPHONE ENCOUNTER
Received request via: Pharmacy    Was the patient seen in the last year in this department? Yes    Does the patient have an active prescription (recently filled or refills available) for medication(s) requested? No    Pharmacy Name:  [facility] - [address]     Does the patient have detention Plus and need 100 day supply (blood pressure, diabetes and cholesterol meds only)? Patient does not have SCP

## 2024-10-05 DIAGNOSIS — E55.9 VITAMIN D DEFICIENCY: ICD-10-CM

## 2024-10-07 RX ORDER — ERGOCALCIFEROL 1.25 MG/1
50000 CAPSULE, LIQUID FILLED ORAL
Qty: 12 CAPSULE | Refills: 0 | Status: SHIPPED | OUTPATIENT
Start: 2024-10-07

## 2025-01-03 ENCOUNTER — APPOINTMENT (OUTPATIENT)
Dept: RADIOLOGY | Facility: MEDICAL CENTER | Age: 40
End: 2025-01-03
Attending: OBSTETRICS & GYNECOLOGY
Payer: COMMERCIAL

## 2025-01-03 DIAGNOSIS — Z12.31 VISIT FOR SCREENING MAMMOGRAM: ICD-10-CM

## 2025-01-03 PROCEDURE — 77067 SCR MAMMO BI INCL CAD: CPT

## 2025-01-13 ENCOUNTER — PATIENT MESSAGE (OUTPATIENT)
Dept: MEDICAL GROUP | Facility: PHYSICIAN GROUP | Age: 40
End: 2025-01-13
Payer: COMMERCIAL

## 2025-01-13 DIAGNOSIS — F41.9 ANXIETY: ICD-10-CM

## 2025-01-14 RX ORDER — BUPROPION HYDROCHLORIDE 100 MG/1
100 TABLET, EXTENDED RELEASE ORAL DAILY
Qty: 14 TABLET | Refills: 0 | Status: SHIPPED | OUTPATIENT
Start: 2025-01-14 | End: 2025-01-23 | Stop reason: SDUPTHER

## 2025-01-15 ENCOUNTER — OFFICE VISIT (OUTPATIENT)
Dept: URGENT CARE | Facility: PHYSICIAN GROUP | Age: 40
End: 2025-01-15
Payer: COMMERCIAL

## 2025-01-15 VITALS
SYSTOLIC BLOOD PRESSURE: 118 MMHG | HEART RATE: 85 BPM | DIASTOLIC BLOOD PRESSURE: 74 MMHG | RESPIRATION RATE: 16 BRPM | HEIGHT: 61 IN | OXYGEN SATURATION: 98 % | TEMPERATURE: 98.1 F | WEIGHT: 163.25 LBS | BODY MASS INDEX: 30.82 KG/M2

## 2025-01-15 DIAGNOSIS — R06.2 SYMPTOM OF WHEEZING: ICD-10-CM

## 2025-01-15 DIAGNOSIS — J98.8 RESPIRATORY TRACT INFECTION: ICD-10-CM

## 2025-01-15 PROCEDURE — 99213 OFFICE O/P EST LOW 20 MIN: CPT

## 2025-01-15 PROCEDURE — 3074F SYST BP LT 130 MM HG: CPT

## 2025-01-15 PROCEDURE — 3078F DIAST BP <80 MM HG: CPT

## 2025-01-15 RX ORDER — AZITHROMYCIN 250 MG/1
TABLET, FILM COATED ORAL
Qty: 6 TABLET | Refills: 0 | Status: SHIPPED | OUTPATIENT
Start: 2025-01-15 | End: 2025-01-15

## 2025-01-15 RX ORDER — ALBUTEROL SULFATE 90 UG/1
2 INHALANT RESPIRATORY (INHALATION) EVERY 6 HOURS PRN
Qty: 8.5 G | Refills: 0 | Status: SHIPPED | OUTPATIENT
Start: 2025-01-15

## 2025-01-15 RX ORDER — DEXTROMETHORPHAN HYDROBROMIDE AND PROMETHAZINE HYDROCHLORIDE 15; 6.25 MG/5ML; MG/5ML
5 SYRUP ORAL EVERY 6 HOURS PRN
Qty: 118 ML | Refills: 0 | Status: SHIPPED | OUTPATIENT
Start: 2025-01-15 | End: 2025-01-22

## 2025-01-15 RX ORDER — AZITHROMYCIN 250 MG/1
TABLET, FILM COATED ORAL
Qty: 6 TABLET | Refills: 0 | Status: SHIPPED | OUTPATIENT
Start: 2025-01-15 | End: 2025-01-23

## 2025-01-15 RX ORDER — BENZONATATE 100 MG/1
100 CAPSULE ORAL 3 TIMES DAILY PRN
Qty: 30 CAPSULE | Refills: 0 | Status: SHIPPED | OUTPATIENT
Start: 2025-01-15 | End: 2025-01-23

## 2025-01-15 ASSESSMENT — ENCOUNTER SYMPTOMS
VOMITING: 0
DIARRHEA: 0
EYE REDNESS: 0
CHILLS: 0
STRIDOR: 0
SINUS PAIN: 0
EYE PAIN: 1
NECK PAIN: 0
HEADACHES: 1
SHORTNESS OF BREATH: 0
FEVER: 0
COUGH: 1
EYE DISCHARGE: 0
SPUTUM PRODUCTION: 1
SORE THROAT: 1
NAUSEA: 0
ABDOMINAL PAIN: 0
WHEEZING: 0

## 2025-01-15 NOTE — LETTER
January 15, 2025    To Whom It May Concern:         This is confirmation that Marcial Gao attended her scheduled appointment with BELEN Akhtar on 1/15/25. She should be excused from work 1/15/25 -01/19/25. She can return to work when she is feeling better.         If you have any questions please do not hesitate to call me at the phone number listed below.    Sincerely,          FREDA Akhtar.  [phone]

## 2025-01-16 NOTE — PROGRESS NOTES
Subjective:     Chief Complaint   Patient presents with    Cough     Chest Congestion / tightness and dry coughing x 5 days Started with sore throat , worse when she is trying to sleep        HPI:  Marcial Gao is a 39 y.o. female who presents for symptoms which started 5 days ago. Pt reports a cough, nasal congestion, mild sore throat, fever, chills, fatigue, malaise, and body aches. She reports the coughing fits have caused her to vomit and are keeping her awake. Denies chest pain, shortness of breath, or wheezing. Denies h/o asthma/copd/CAP. No immunocompromise. Has tried OTC cold medications without significant relief of symptoms. No recent ABX use. No other aggravating or alleviating factors. Denies known exposure to COVID-19.       ROS:  Review of Systems   Constitutional:  Positive for malaise/fatigue. Negative for chills and fever.   HENT:  Positive for congestion, ear pain and sore throat. Negative for ear discharge, hearing loss and sinus pain.    Eyes:  Positive for pain. Negative for discharge and redness.   Respiratory:  Positive for cough and sputum production. Negative for shortness of breath, wheezing and stridor.    Cardiovascular:  Negative for chest pain.   Gastrointestinal:  Negative for abdominal pain, diarrhea, nausea and vomiting.   Genitourinary:  Negative for dysuria.   Musculoskeletal:  Negative for neck pain.   Skin:  Negative for rash.   Neurological:  Positive for headaches.        CURRENT MEDICATIONS:  Current Outpatient Medications   Medication Sig Refill Last Dispense    albuterol 108 (90 Base) MCG/ACT Aero Soln inhalation aerosol Inhale 2 Puffs every 6 hours as needed for Shortness of Breath. 0 Unknown (outside pharmacy)    buPROPion SR (WELLBUTRIN-SR) 100 MG TABLET SR 12 HR Take 1 Tablet by mouth every day. 0 Unknown (outside pharmacy)    SUMAtriptan (IMITREX) 50 MG Tab TAKE 1 TABLET ONE TIME AS NEEDED FOR MIGRAINE FOR UP TO ONE DOSE 8 Unknown (outside pharmacy)     "vitamin D2, Ergocalciferol, (DRISDOL) 1.25 MG (41661 UT) Cap capsule TAKE 1 CAPSULE EVERY 7 DAYS 0 Unknown (outside pharmacy)       ALLERGIES:   Allergies   Allergen Reactions    Latex Hives       PROBLEM LIST:    does not have any pertinent problems on file.    Allergies, Medications, & Tobacco/Substance Use were reconciled by the Medical Assistant and reviewed by myself.     Objective:   /74   Pulse 85   Temp 36.7 °C (98.1 °F) (Temporal)   Resp 16   Ht 1.549 m (5' 1\")   Wt 74 kg (163 lb 4 oz)   SpO2 98%   BMI 30.85 kg/m²     Physical Exam  Constitutional:       General: She is not in acute distress.     Appearance: She is not ill-appearing or toxic-appearing.   HENT:      Right Ear: Tympanic membrane normal.      Mouth/Throat:      Pharynx: Posterior oropharyngeal erythema present.      Tonsils: No tonsillar exudate. 2+ on the right. 2+ on the left.   Cardiovascular:      Rate and Rhythm: Normal rate and regular rhythm.   Pulmonary:      Effort: Pulmonary effort is normal.      Breath sounds: Normal breath sounds.   Skin:     General: Skin is warm and dry.   Neurological:      Mental Status: She is alert.         Assessment/Plan:   Pt's history and physical exam consistent with respiratory tract infection. Normal pulmonary exam, except for cough, no concern for pneumonia.  Normal ENT exam except for nasal congestion, no concern for acute otitis media, strep pharyngitis, or bacterial sinus infection.Supportive treatment discussed. Contingent antibiotic prescription given to patient to fill upon meeting criteria of guidelines discussed.   Assessment & Plan  Respiratory tract infection    Orders:    benzonatate (TESSALON) 100 MG Cap; Take 1 Capsule by mouth 3 times a day as needed for Cough for up to 10 days.    promethazine-dextromethorphan (PROMETHAZINE-DM) 6.25-15 MG/5ML syrup; Take 5 mL by mouth every 6 hours as needed for Cough for up to 7 days. (caution: may cause sedation)    azithromycin " (ZITHROMAX) 250 MG Tab; Take 2 tablets by mouth on day one. Take one tablet by mouth the remaining days until gone  - Encouraged pt to treat symptoms by using humidified air, salt water gargles, and/or sipping warm liquids.   - Educated pt on use of OTC tylenol or ibuprofen (if no contraindications) per package instructions for body aches, headaches, pain from sore throat. Discussed OTC topical analgesic spray or lozenges. Discussed nonsedating antihistamine like Zyrtec (cetirizine) or Allegra (fexofenadine) to reduce the amount of nasal inflammation.  - Pt encouraged to practice hand hygiene, wear a face mask in public or self isolate, remain well hydrated, and get sufficient rest/sleep.     Discussed differential diagnosis, management options, risks/benefits, and alternatives to planned treatment. Pt expressed understanding and the treatment plan was agreed upon. Questions were encouraged and answered. Pt encouraged to return to urgent care as needed if new or worsening symptoms or if there is no improvement in condition. Pt educated in red flags and indications to immediately call 911 or present to the Emergency Department. Advised the patient to follow-up with the primary care physician for recheck, reevaluation, and further management.    I personally reviewed prior external notes and test results pertinent to today's visit. I have independently reviewed and interpreted all diagnostics ordered during this visit.    Please note that this dictation was created using voice recognition software. I have made a reasonable attempt to correct obvious errors, but I expect that there are errors of grammar and possibly content that I did not discover before finalizing the note.    This note was electronically signed by EUGENIA Wynn

## 2025-01-23 ENCOUNTER — HOSPITAL ENCOUNTER (OUTPATIENT)
Dept: LAB | Facility: MEDICAL CENTER | Age: 40
End: 2025-01-23
Attending: NURSE PRACTITIONER
Payer: COMMERCIAL

## 2025-01-23 ENCOUNTER — OFFICE VISIT (OUTPATIENT)
Dept: MEDICAL GROUP | Facility: PHYSICIAN GROUP | Age: 40
End: 2025-01-23
Payer: COMMERCIAL

## 2025-01-23 VITALS
TEMPERATURE: 98 F | HEART RATE: 80 BPM | WEIGHT: 159.6 LBS | BODY MASS INDEX: 30.13 KG/M2 | HEIGHT: 61 IN | OXYGEN SATURATION: 98 % | SYSTOLIC BLOOD PRESSURE: 120 MMHG | DIASTOLIC BLOOD PRESSURE: 72 MMHG

## 2025-01-23 DIAGNOSIS — E66.9 OBESITY (BMI 30-39.9): ICD-10-CM

## 2025-01-23 DIAGNOSIS — E78.2 MIXED HYPERLIPIDEMIA: ICD-10-CM

## 2025-01-23 DIAGNOSIS — Z00.00 WELLNESS EXAMINATION: ICD-10-CM

## 2025-01-23 DIAGNOSIS — Z12.31 ENCOUNTER FOR SCREENING MAMMOGRAM FOR MALIGNANT NEOPLASM OF BREAST: ICD-10-CM

## 2025-01-23 DIAGNOSIS — R23.2 HOT FLASHES: ICD-10-CM

## 2025-01-23 DIAGNOSIS — E55.9 VITAMIN D DEFICIENCY: ICD-10-CM

## 2025-01-23 DIAGNOSIS — R53.82 CHRONIC FATIGUE: ICD-10-CM

## 2025-01-23 DIAGNOSIS — F41.9 ANXIETY: ICD-10-CM

## 2025-01-23 DIAGNOSIS — R92.30 DENSE BREASTS: ICD-10-CM

## 2025-01-23 DIAGNOSIS — G43.109 MIGRAINE WITH AURA AND WITHOUT STATUS MIGRAINOSUS, NOT INTRACTABLE: ICD-10-CM

## 2025-01-23 PROBLEM — N89.8 VAGINAL IRRITATION: Status: RESOLVED | Noted: 2023-04-25 | Resolved: 2025-01-23

## 2025-01-23 PROBLEM — J32.9 SINUSITIS: Status: RESOLVED | Noted: 2018-09-12 | Resolved: 2025-01-23

## 2025-01-23 LAB
25(OH)D3 SERPL-MCNC: 45 NG/ML (ref 30–100)
ALBUMIN SERPL BCP-MCNC: 4.1 G/DL (ref 3.2–4.9)
ALBUMIN/GLOB SERPL: 1.4 G/DL
ALP SERPL-CCNC: 70 U/L (ref 30–99)
ALT SERPL-CCNC: 16 U/L (ref 2–50)
ANION GAP SERPL CALC-SCNC: 7 MMOL/L (ref 7–16)
AST SERPL-CCNC: 18 U/L (ref 12–45)
BASOPHILS # BLD AUTO: 0.6 % (ref 0–1.8)
BASOPHILS # BLD: 0.04 K/UL (ref 0–0.12)
BILIRUB SERPL-MCNC: 0.8 MG/DL (ref 0.1–1.5)
BUN SERPL-MCNC: 11 MG/DL (ref 8–22)
CALCIUM ALBUM COR SERPL-MCNC: 9.2 MG/DL (ref 8.5–10.5)
CALCIUM SERPL-MCNC: 9.3 MG/DL (ref 8.5–10.5)
CHLORIDE SERPL-SCNC: 107 MMOL/L (ref 96–112)
CHOLEST SERPL-MCNC: 218 MG/DL (ref 100–199)
CO2 SERPL-SCNC: 24 MMOL/L (ref 20–33)
CREAT SERPL-MCNC: 0.79 MG/DL (ref 0.5–1.4)
EOSINOPHIL # BLD AUTO: 0.07 K/UL (ref 0–0.51)
EOSINOPHIL NFR BLD: 1 % (ref 0–6.9)
ERYTHROCYTE [DISTWIDTH] IN BLOOD BY AUTOMATED COUNT: 41.8 FL (ref 35.9–50)
GFR SERPLBLD CREATININE-BSD FMLA CKD-EPI: 96 ML/MIN/1.73 M 2
GLOBULIN SER CALC-MCNC: 3 G/DL (ref 1.9–3.5)
GLUCOSE SERPL-MCNC: 101 MG/DL (ref 65–99)
HCT VFR BLD AUTO: 39.7 % (ref 37–47)
HDLC SERPL-MCNC: 44 MG/DL
HGB BLD-MCNC: 13.1 G/DL (ref 12–16)
IMM GRANULOCYTES # BLD AUTO: 0.06 K/UL (ref 0–0.11)
IMM GRANULOCYTES NFR BLD AUTO: 0.9 % (ref 0–0.9)
LDLC SERPL CALC-MCNC: 143 MG/DL
LYMPHOCYTES # BLD AUTO: 1.97 K/UL (ref 1–4.8)
LYMPHOCYTES NFR BLD: 28.2 % (ref 22–41)
MCH RBC QN AUTO: 29.6 PG (ref 27–33)
MCHC RBC AUTO-ENTMCNC: 33 G/DL (ref 32.2–35.5)
MCV RBC AUTO: 89.6 FL (ref 81.4–97.8)
MONOCYTES # BLD AUTO: 0.49 K/UL (ref 0–0.85)
MONOCYTES NFR BLD AUTO: 7 % (ref 0–13.4)
NEUTROPHILS # BLD AUTO: 4.35 K/UL (ref 1.82–7.42)
NEUTROPHILS NFR BLD: 62.3 % (ref 44–72)
NRBC # BLD AUTO: 0 K/UL
NRBC BLD-RTO: 0 /100 WBC (ref 0–0.2)
PLATELET # BLD AUTO: 275 K/UL (ref 164–446)
PMV BLD AUTO: 12.4 FL (ref 9–12.9)
POTASSIUM SERPL-SCNC: 4.5 MMOL/L (ref 3.6–5.5)
PROT SERPL-MCNC: 7.1 G/DL (ref 6–8.2)
RBC # BLD AUTO: 4.43 M/UL (ref 4.2–5.4)
SODIUM SERPL-SCNC: 138 MMOL/L (ref 135–145)
T4 FREE SERPL-MCNC: 1.25 NG/DL (ref 0.93–1.7)
TRIGL SERPL-MCNC: 156 MG/DL (ref 0–149)
TSH SERPL-ACNC: 2.78 UIU/ML (ref 0.35–5.5)
WBC # BLD AUTO: 7 K/UL (ref 4.8–10.8)

## 2025-01-23 PROCEDURE — 3074F SYST BP LT 130 MM HG: CPT | Performed by: NURSE PRACTITIONER

## 2025-01-23 PROCEDURE — 99214 OFFICE O/P EST MOD 30 MIN: CPT | Performed by: NURSE PRACTITIONER

## 2025-01-23 PROCEDURE — 84443 ASSAY THYROID STIM HORMONE: CPT

## 2025-01-23 PROCEDURE — 3078F DIAST BP <80 MM HG: CPT | Performed by: NURSE PRACTITIONER

## 2025-01-23 PROCEDURE — 80053 COMPREHEN METABOLIC PANEL: CPT

## 2025-01-23 PROCEDURE — 84439 ASSAY OF FREE THYROXINE: CPT

## 2025-01-23 PROCEDURE — 85025 COMPLETE CBC W/AUTO DIFF WBC: CPT

## 2025-01-23 PROCEDURE — 36415 COLL VENOUS BLD VENIPUNCTURE: CPT

## 2025-01-23 PROCEDURE — 82306 VITAMIN D 25 HYDROXY: CPT

## 2025-01-23 PROCEDURE — 80061 LIPID PANEL: CPT

## 2025-01-23 RX ORDER — BUPROPION HYDROCHLORIDE 100 MG/1
100 TABLET, EXTENDED RELEASE ORAL DAILY
Qty: 90 TABLET | Refills: 3 | Status: SHIPPED | OUTPATIENT
Start: 2025-01-23

## 2025-01-23 RX ORDER — BUPROPION HYDROCHLORIDE 100 MG/1
100 TABLET, EXTENDED RELEASE ORAL DAILY
Qty: 14 TABLET | Refills: 0 | Status: SHIPPED | OUTPATIENT
Start: 2025-01-23 | End: 2025-01-23

## 2025-01-23 RX ORDER — SUMATRIPTAN 50 MG/1
50 TABLET, FILM COATED ORAL
Qty: 10 TABLET | Refills: 3 | Status: SHIPPED | OUTPATIENT
Start: 2025-01-23

## 2025-01-23 ASSESSMENT — PATIENT HEALTH QUESTIONNAIRE - PHQ9
SUM OF ALL RESPONSES TO PHQ QUESTIONS 1-9: 0
2. FEELING DOWN, DEPRESSED, IRRITABLE, OR HOPELESS: NOT AT ALL
3. TROUBLE FALLING OR STAYING ASLEEP OR SLEEPING TOO MUCH: NOT AT ALL
1. LITTLE INTEREST OR PLEASURE IN DOING THINGS: NOT AT ALL
5. POOR APPETITE OR OVEREATING: NOT AT ALL
8. MOVING OR SPEAKING SO SLOWLY THAT OTHER PEOPLE COULD HAVE NOTICED. OR THE OPPOSITE, BEING SO FIGETY OR RESTLESS THAT YOU HAVE BEEN MOVING AROUND A LOT MORE THAN USUAL: NOT AT ALL
4. FEELING TIRED OR HAVING LITTLE ENERGY: NOT AT ALL
9. THOUGHTS THAT YOU WOULD BE BETTER OFF DEAD, OR OF HURTING YOURSELF: NOT AT ALL
SUM OF ALL RESPONSES TO PHQ9 QUESTIONS 1 AND 2: 0
6. FEELING BAD ABOUT YOURSELF - OR THAT YOU ARE A FAILURE OR HAVE LET YOURSELF OR YOUR FAMILY DOWN: NOT AL ALL
7. TROUBLE CONCENTRATING ON THINGS, SUCH AS READING THE NEWSPAPER OR WATCHING TELEVISION: NOT AT ALL

## 2025-01-23 NOTE — PROGRESS NOTES
Verbal consent was acquired by the patient to use [employer] ambient listening note generation during this visit yes    Subjective:     HPI:   History of Present Illness  The patient is a 39-year-old female who presents for evaluation of hot flashes, respiratory tract infection, and family issues.    Hot Flashes  She has been experiencing severe hot flashes for the past year, which gynecology attributes to her long-term use of Wellbutrin. These episodes occur exclusively at night, causing her to awaken drenched in sweat. She has discussed this issue with her gynecologist, Dr. Galindo, who suggested a thyroid function test, despite her annual thyroid check-ups. He also proposed an estrogen level test, suspecting perimenopause as the cause of her symptoms. She recalls a previous attempt to resume birth control in her early 30s, which led to increased anxiety and irritability, and subsequently discontinued it. She is currently on a daily dose of Wellbutrin 100 mg sustained release, but due to a delay in receiving her prescription from [facility], she had to resort to using the remaining 150 mg or 125 mg tablets from a previous prescription.  - Onset: Past year  - Location: Not specified  - Duration: Episodes occur exclusively at night  - Character: Severe hot flashes causing her to awaken drenched in sweat  - Alleviating/Aggravating Factors: Attributed to long-term use of Wellbutrin; previous birth control use increased anxiety and irritability  - Timing: Nighttime  - Severity: Severe    URI  Last week, she sought medical attention at an urgent care facility where she was diagnosed with a respiratory tract infection. She reported a severe sore throat that lasted for 3 days before evolving into a dry cough, which disrupted her sleep. She has a history of annual bronchitis episodes, with the exception of the previous year. On Thursday morning, she began expectorating green-yellow sputum and was advised to start the  Z-Juanito only upon noticing this symptom. She completed the Z-Juanito course on Monday but continues to experience congestion. She also reported diarrhea as a side effect of the Z-Juanito. She is concerned about the possibility of walking pneumonia, as she experiences chest rattling and congestion during morning breathing. She was prescribed an albuterol inhaler for shortness of breath, Tessalon Perles, and a cough suppressant liquid, as she can not tolerate codeine. A Z-Juanito was also prescribed.  - Onset: Last week  - Location: Respiratory tract  - Duration: Severe sore throat for 3 days, followed by a dry cough  - Character: Severe sore throat, dry cough, green-yellow sputum, congestion, chest rattling, and congestion during morning breathing  - Alleviating/Aggravating Factors: Z-Juanito, albuterol inhaler, Tessalon Perles, cough suppressant liquid  - Timing: Disrupted sleep, morning congestion  - Severity: Severe sore throat, concern about walking pneumonia    Family Issues  She is currently managing two jobs and dealing with personal family issues. She is interested in exploring family therapy options, including individual, couple, and group sessions.    Mammogram   She recently underwent a mammogram, which revealed dense breast tissue, a change from previous results. She has been performing self-breast exams and has noticed small lumps on the sides of her breasts which change with hormones.    Anxiety is chronic in nature and stable.  Patient states that the Wellbutrin  mg every 12 hours is overall working well.  She does want to potentially discuss this in more detail after we get her lab results.  Declines to schedule follow-up now.    Supplemental Information  She has been diagnosed with alopecia by dermatology.    SOCIAL HISTORY  She is currently working 2 jobs.    ALLERGIES  The patient has an intolerance to CODEINE.    MEDICATIONS  - Wellbutrin  - Imitrex  - Albuterol inhaler  - Tessalon Perles  - Z-Juanito    [facility]  "Maintenance: Completed    Objective:     Exam:  /72 (BP Location: Left arm, Patient Position: Sitting, BP Cuff Size: Adult)   Pulse 80   Temp 36.7 °C (98 °F) (Temporal)   Ht 1.549 m (5' 1\")   Wt 72.4 kg (159 lb 9.6 oz)   SpO2 98%   BMI 30.16 kg/m²  Body mass index is 30.16 kg/m².    Physical Exam  Constitutional:       Appearance: Normal appearance.   HENT:      Head: Normocephalic and atraumatic.   Cardiovascular:      Rate and Rhythm: Normal rate and regular rhythm.      Pulses: Normal pulses.      Heart sounds: Normal heart sounds.   Pulmonary:      Effort: Pulmonary effort is normal.      Breath sounds: Normal breath sounds.   Skin:     General: Skin is warm and dry.      Capillary Refill: Capillary refill takes less than 2 seconds.   Neurological:      General: No focal deficit present.      Mental Status: She is alert and oriented to person, place, and time.          Results  Imaging  Mammogram shows dense breast tissue.    Assessment & Plan:     1. Migraine with aura and without status migrainosus, not intractable  SUMAtriptan (IMITREX) 50 MG Tab      2. Anxiety  Referral to Behavioral Health    buPROPion SR (WELLBUTRIN-SR) 100 MG TABLET SR 12 HR    DISCONTINUED: buPROPion SR (WELLBUTRIN-SR) 100 MG TABLET SR 12 HR      3. Vitamin D deficiency  VITAMIN D,25 HYDROXY (DEFICIENCY)      4. Mixed hyperlipidemia  Lipid Profile      5. Wellness examination  CBC WITH DIFFERENTIAL    Comp Metabolic Panel    Lipid Profile    TSH+FREE T4      6. Chronic fatigue  TSH+FREE T4    Referral to Gynecology      7. Hot flashes  Referral to Gynecology      8. Encounter for screening mammogram for malignant neoplasm of breast        9. Dense breasts  US-SCREENING WHOLE BREAST BILATERAL (3D SCREENING)      10. Obesity (BMI 30-39.9)  Patient identified as having weight management issue.  Appropriate orders and counseling given.          Assessment & Plan  1. Hot flashes: Potentially attributed to perimenopause or a side " effect of Wellbutrin.  - Referral to Dr. Palacios's office for further evaluation  - Laboratory tests ordered to assess vitamin D levels, TSH, T4, cholesterol, liver function, kidney function, and red blood cells    2.  Anxiety  Continue Wellbutrin 100 mg SR twice up to daily    3. Respiratory tract infection: Lingering symptoms consistent with a viral infection.  - Monitor for new fever spikes or facial pain, which could suggest a sinus infection    3. Family issues.  - Referral for family therapy, including individual, couple, and group sessions    4. Dense breast tissue.  - 3D whole breast screening ordered for this year    5.  Migraine headaches  Chronic in nature and stable.  Please continue Imitrex 50 mg by mouth as needed      Return in about 1 month (around 2/23/2025), or if symptoms worsen or fail to improve.    I have placed the below orders and discussed them with an approved delegating provider. The MA is performing the below orders under the direction of Dr. Barth.      Please note that this dictation was created using voice recognition software. I have made every reasonable attempt to correct obvious errors, but I expect that there are errors of grammar and possibly content that I did not discover before finalizing the note.

## 2025-02-06 ENCOUNTER — PATIENT MESSAGE (OUTPATIENT)
Dept: MEDICAL GROUP | Facility: PHYSICIAN GROUP | Age: 40
End: 2025-02-06
Payer: COMMERCIAL

## 2025-02-06 DIAGNOSIS — E55.9 VITAMIN D DEFICIENCY: ICD-10-CM

## 2025-02-07 NOTE — PATIENT COMMUNICATION
Received request via: Patient    Was the patient seen in the last year in this department? Yes    Does the patient have an active prescription (recently filled or refills available) for medication(s) requested? No    Pharmacy Name: [facility]    Does the patient have nursing home Plus and need 100-day supply? (This applies to ALL medications) Patient does not have SCP  Requested Prescriptions     Pending Prescriptions Disp Refills    vitamin D2, Ergocalciferol, (DRISDOL) 1.25 MG (30209 UT) Cap capsule 12 Capsule 0     Sig: Take 1 Capsule by mouth every 7 days.

## 2025-02-10 RX ORDER — ERGOCALCIFEROL 1.25 MG/1
50000 CAPSULE, LIQUID FILLED ORAL
Qty: 12 CAPSULE | Refills: 3 | Status: SHIPPED | OUTPATIENT
Start: 2025-02-10

## 2025-02-18 ENCOUNTER — RESULTS FOLLOW-UP (OUTPATIENT)
Dept: URGENT CARE | Facility: PHYSICIAN GROUP | Age: 40
End: 2025-02-18

## 2025-02-18 ENCOUNTER — OFFICE VISIT (OUTPATIENT)
Dept: URGENT CARE | Facility: PHYSICIAN GROUP | Age: 40
End: 2025-02-18
Payer: COMMERCIAL

## 2025-02-18 VITALS
WEIGHT: 161 LBS | OXYGEN SATURATION: 98 % | DIASTOLIC BLOOD PRESSURE: 66 MMHG | RESPIRATION RATE: 16 BRPM | TEMPERATURE: 100.6 F | SYSTOLIC BLOOD PRESSURE: 114 MMHG | BODY MASS INDEX: 30.4 KG/M2 | HEART RATE: 102 BPM | HEIGHT: 61 IN

## 2025-02-18 DIAGNOSIS — J06.9 VIRAL URI WITH COUGH: ICD-10-CM

## 2025-02-18 LAB
FLUAV RNA SPEC QL NAA+PROBE: NEGATIVE
FLUBV RNA SPEC QL NAA+PROBE: NEGATIVE
RSV RNA SPEC QL NAA+PROBE: NEGATIVE
SARS-COV-2 RNA RESP QL NAA+PROBE: NEGATIVE

## 2025-02-18 PROCEDURE — 99214 OFFICE O/P EST MOD 30 MIN: CPT

## 2025-02-18 PROCEDURE — 0241U POCT CEPHEID COV-2, FLU A/B, RSV - PCR: CPT

## 2025-02-18 PROCEDURE — 3078F DIAST BP <80 MM HG: CPT

## 2025-02-18 PROCEDURE — 3074F SYST BP LT 130 MM HG: CPT

## 2025-02-18 RX ORDER — BENZONATATE 100 MG/1
100 CAPSULE ORAL 3 TIMES DAILY PRN
Qty: 60 CAPSULE | Refills: 0 | Status: SHIPPED | OUTPATIENT
Start: 2025-02-18

## 2025-02-18 RX ORDER — DEXTROMETHORPHAN HYDROBROMIDE AND PROMETHAZINE HYDROCHLORIDE 15; 6.25 MG/5ML; MG/5ML
5 SYRUP ORAL EVERY 6 HOURS PRN
Qty: 118 ML | Refills: 0 | Status: SHIPPED | OUTPATIENT
Start: 2025-02-18 | End: 2025-02-25

## 2025-02-18 ASSESSMENT — ENCOUNTER SYMPTOMS
SORE THROAT: 1
VOMITING: 1
FEVER: 1
COUGH: 1
HEADACHES: 1
SINUS PAIN: 1

## 2025-02-18 ASSESSMENT — FIBROSIS 4 INDEX: FIB4 SCORE: 0.67

## 2025-02-18 NOTE — LETTER
February 18, 2025    To Whom It May Concern:         This is confirmation that Marcial Gao attended her appointment with Bridgett Singh P.A.-C. on 2/18/25. Please excuse her absence from work tomorrow. She may return to work in 1-3 days if feeling well and if fever has resolved.          If you have any questions please do not hesitate to call me at the phone number listed below.    Sincerely,          Bridgett Singh P.A.-C.  [phone]

## 2025-02-19 NOTE — PROGRESS NOTES
Subjective:     CHIEF COMPLAINT  Chief Complaint   Patient presents with    Cough     X today with sore throat, headache, chest hurts to cough, vomit from cough, body aches, chills.         HPI  Marcial Gao is a very pleasant 39 y.o. female who presents accompanied by her  with a sore throat, cough, chest tightness, headache, head pressure, sinus pressure, vomiting secondary to coughing, and a fever.  She has not taken any medications today.  She does report recent travel.  She has not had any sick contacts.    REVIEW OF SYSTEMS  Review of Systems   Constitutional:  Positive for fever and malaise/fatigue.   HENT:  Positive for sinus pain and sore throat.    Respiratory:  Positive for cough.    Gastrointestinal:  Positive for vomiting (2ndary to cough).   Neurological:  Positive for headaches.       PAST MEDICAL HISTORY  Patient Active Problem List    Diagnosis Date Noted    Diarrhea 09/18/2023    Acute right flank pain 02/09/2023    COVID 06/29/2022    Vitamin D deficiency 03/25/2021    Hair loss 12/31/2020    Irregular periods 09/04/2020    Anxiety 04/30/2020    Current mild episode of major depressive disorder without prior episode (HCC) 04/30/2020    Primary osteoarthritis of both wrists 05/25/2018    Mixed hyperlipidemia 05/25/2018    Chronic fatigue 05/25/2018    Migraine with aura and without status migrainosus, not intractable 05/25/2018       SURGICAL HISTORY  patient denies any surgical history    ALLERGIES  Allergies   Allergen Reactions    Latex Hives       CURRENT MEDICATIONS  Home Medications       Reviewed by PETER Rao-DEBRA (Physician Assistant) on 02/18/25 at 2006  Med List Status: <None>     Medication Last Dose Status   albuterol 108 (90 Base) MCG/ACT Aero Soln inhalation aerosol PRN Active   buPROPion SR (WELLBUTRIN-SR) 100 MG TABLET SR 12 HR Taking Active   SUMAtriptan (IMITREX) 50 MG Tab PRN Active   vitamin D2, Ergocalciferol, (DRISDOL) 1.25 MG (71615 UT) Cap  "capsule Taking Active                    SOCIAL HISTORY  Social History     Tobacco Use    Smoking status: Never    Smokeless tobacco: Never   Vaping Use    Vaping status: Never Used   Substance and Sexual Activity    Alcohol use: Yes     Comment: rarely    Drug use: Not Currently     Types: Marijuana, Oral     Comment: gummnies- once/twice a month per pt    Sexual activity: Yes     Partners: Male     Birth control/protection: None       FAMILY HISTORY  Family History   Problem Relation Age of Onset    Psychiatric Illness Mother         depression    Psychiatric Illness Sister         depression          Objective:     VITAL SIGNS: /66   Pulse (!) 102   Temp (!) 38.1 °C (100.6 °F) (Temporal)   Resp 16   Ht 1.549 m (5' 1\")   Wt 73 kg (161 lb)   LMP 02/02/2025   SpO2 98%   BMI 30.42 kg/m²     PHYSICAL EXAM  Physical Exam  Vitals reviewed. Exam conducted with a chaperone present.   Constitutional:       General: She is not in acute distress.     Appearance: Normal appearance. She is not ill-appearing or toxic-appearing.   HENT:      Head: Normocephalic and atraumatic.      Right Ear: Tympanic membrane, ear canal and external ear normal.      Left Ear: Tympanic membrane, ear canal and external ear normal.      Nose: Nose normal.      Mouth/Throat:      Mouth: Mucous membranes are moist.      Pharynx: Posterior oropharyngeal erythema present. No oropharyngeal exudate.   Eyes:      Conjunctiva/sclera: Conjunctivae normal.      Pupils: Pupils are equal, round, and reactive to light.   Cardiovascular:      Rate and Rhythm: Regular rhythm. Tachycardia present.      Heart sounds: Normal heart sounds.   Pulmonary:      Effort: Pulmonary effort is normal. No respiratory distress.      Breath sounds: Normal breath sounds. No stridor. No wheezing, rhonchi or rales.   Skin:     General: Skin is warm and dry.      Coloration: Skin is not pale.      Findings: No rash.   Neurological:      General: No focal deficit " present.      Mental Status: She is alert and oriented to person, place, and time.   Psychiatric:         Mood and Affect: Mood normal.         Assessment/Plan:     1. Viral URI with cough  - POCT Cepheid CoV-2, Flu A/B, RSV - PCR  - promethazine-dextromethorphan (PROMETHAZINE-DM) 6.25-15 MG/5ML syrup; Take 5 mL by mouth every 6 hours as needed for Cough for up to 7 days. (caution: may cause sedation)  Dispense: 118 mL; Refill: 0  - benzonatate (TESSALON) 100 MG Cap; Take 1 Capsule by mouth 3 times a day as needed for Cough.  Dispense: 60 Capsule; Refill: 0  -Tylenol OTC as needed for discomfort and fever  -Rest and hydrate  -Monitor symptoms and return to clinic if symptoms worsen or fail to resolve    MDM/Comments:  Patient is displaying systemic symptoms including a fever and tachycardia on physical examination. These symptoms are likely contributed to a viral URI. Patient has stable vital signs and is non-toxic appearing.  Viral testing for COVID, influenza, and RSV performed in office with negative results.  Patient has been informed of results via zlien.  Patient has been provided benzonatate for daytime relief and Promethazine DM for nighttime cough relief.  Patient has previously tolerated Promethazine DM.  Discussed supportive care with hydration, rest, Tylenol as needed. Patient demonstrated understanding of treatment plan at this time and will RTC if symptoms worsen or fail to resolve.       Differential diagnosis, natural history, supportive care, and indications for immediate follow-up discussed. All questions answered. Patient agrees with the plan of care.    Follow-up as needed if symptoms worsen or fail to improve to PCP, Urgent care or Emergency Room.    I have personally reviewed prior external notes and test results pertinent to today's visit.  I have independently reviewed and interpreted all diagnostics ordered during this urgent care acute visit.   Discussed management options  (risks,benefits, and alternatives to treatment). Pt expresses understanding and the treatment plan was agreed upon. Questions were encouraged and answered to pt's satisfaction.    Please note that this dictation was created using voice recognition software. I have made a reasonable attempt to correct obvious errors, but I expect that there are errors of grammar and possibly content that I did not discover before finalizing the note.

## 2025-05-11 ENCOUNTER — OFFICE VISIT (OUTPATIENT)
Dept: URGENT CARE | Facility: PHYSICIAN GROUP | Age: 40
End: 2025-05-11
Payer: COMMERCIAL

## 2025-05-11 ENCOUNTER — RESULTS FOLLOW-UP (OUTPATIENT)
Dept: URGENT CARE | Facility: PHYSICIAN GROUP | Age: 40
End: 2025-05-11

## 2025-05-11 VITALS
BODY MASS INDEX: 31.24 KG/M2 | HEART RATE: 96 BPM | RESPIRATION RATE: 18 BRPM | DIASTOLIC BLOOD PRESSURE: 82 MMHG | OXYGEN SATURATION: 97 % | WEIGHT: 165.34 LBS | SYSTOLIC BLOOD PRESSURE: 126 MMHG | TEMPERATURE: 98 F

## 2025-05-11 DIAGNOSIS — J06.9 UPPER RESPIRATORY TRACT INFECTION, UNSPECIFIED TYPE: ICD-10-CM

## 2025-05-11 DIAGNOSIS — R05.1 ACUTE COUGH: ICD-10-CM

## 2025-05-11 PROCEDURE — 3079F DIAST BP 80-89 MM HG: CPT

## 2025-05-11 PROCEDURE — 0241U POCT CEPHEID COV-2, FLU A/B, RSV - PCR: CPT

## 2025-05-11 PROCEDURE — 99213 OFFICE O/P EST LOW 20 MIN: CPT

## 2025-05-11 PROCEDURE — 3074F SYST BP LT 130 MM HG: CPT

## 2025-05-11 RX ORDER — BENZONATATE 100 MG/1
100 CAPSULE ORAL 3 TIMES DAILY PRN
Qty: 30 CAPSULE | Refills: 0 | Status: SHIPPED | OUTPATIENT
Start: 2025-05-11

## 2025-05-11 ASSESSMENT — ENCOUNTER SYMPTOMS
COUGH: 1
WHEEZING: 0
SHORTNESS OF BREATH: 0

## 2025-05-11 ASSESSMENT — FIBROSIS 4 INDEX: FIB4 SCORE: 0.67

## 2025-05-11 NOTE — LETTER
[facility] [address] [id number]     May 11, 2025    Patient: Marcial Gao   YOB: 1985   Date of Visit: 5/11/2025       To Whom It May Concern:    Marcial Gao was seen and treated in our department on 5/11/2025.  Please excuse absences this week.    Sincerely,     Deances BOB Brooks, APRN, FNP-BC

## 2025-05-12 NOTE — PROGRESS NOTES
Subjective     Marcial Gao is a 39 y.o. female who presents with Cough (Cough, headache, body aches, congestion X 3 days)    Onset approximately 3 to 4 days ago. Patient reports productive cough. No hemoptysis. Associated symptoms include sore throat (improved), body aches, nasal congestion, headaches. Subjective fevers. Denies history of asthma or pneumonia. Prior treatments include Emergen-C. She is self-treating with Z-juice. No other aggravating or alleviating factors.  was sick. Tolerating oral intake and fluids, voiding normal output throughout the day.          Review of Systems   Respiratory:  Positive for cough. Negative for shortness of breath and wheezing.    All other systems reviewed and are negative.    Medications:    albuterol Aers  benzonatate Caps  buPROPion SR Tb12  SUMAtriptan Tabs  vitamin D2 (Ergocalciferol) Caps    Allergies:  Latex    Past Social Hx:  Marcial Gao  reports that she has never smoked. She has never used smokeless tobacco. She reports current alcohol use. She reports that she does not currently use drugs after having used the following drugs: Marijuana and Oral.    Past Medical Hx:   Past Medical History:   Diagnosis Date    Hyperlipidemia     Osteoarthritis     Vitamin D deficiency disease           Objective     /82 (BP Location: Left arm, Patient Position: Sitting, BP Cuff Size: Adult)   Pulse 96   Temp 36.7 °C (98 °F) (Temporal)   Resp 18   Wt 75 kg (165 lb 5.5 oz)   SpO2 97%   BMI 31.24 kg/m²      Physical Exam  Vitals reviewed.   Constitutional:       Appearance: Normal appearance.   HENT:      Head: Normocephalic and atraumatic.      Right Ear: External ear normal.      Left Ear: External ear normal.      Nose: Rhinorrhea present.      Mouth/Throat:      Mouth: Mucous membranes are moist.      Pharynx: Uvula midline. Postnasal drip present. No oropharyngeal exudate or posterior oropharyngeal erythema.      Tonsils: No tonsillar  exudate or tonsillar abscesses.   Eyes:      Conjunctiva/sclera: Conjunctivae normal.   Cardiovascular:      Rate and Rhythm: Normal rate.      Heart sounds: Normal heart sounds.   Pulmonary:      Effort: Pulmonary effort is normal.      Breath sounds: Normal breath sounds. No wheezing, rhonchi or rales.   Musculoskeletal:      Cervical back: Normal range of motion and neck supple.   Lymphadenopathy:      Cervical: No cervical adenopathy.   Skin:     General: Skin is warm and dry.      Capillary Refill: Capillary refill takes less than 2 seconds.   Neurological:      Mental Status: She is alert and oriented to person, place, and time.   Psychiatric:         Behavior: Behavior normal.                    Results for orders placed or performed in visit on 05/11/25   POCT CEPHEID COV-2, FLU A/B, RSV - PCR    Collection Time: 05/11/25  8:25 PM   Result Value Ref Range    SARS-CoV-2 by PCR Negative Negative, Invalid    Influenza virus A RNA Negative Negative, Invalid    Influenza virus B, PCR Negative Negative, Invalid    RSV, PCR Negative Negative, Invalid        Assessment & Plan  Upper respiratory tract infection, unspecified type    Orders:    POCT CEPHEID COV-2, FLU A/B, RSV - PCR    Acute cough    Orders:    benzonatate (TESSALON) 100 MG Cap; Take 1 Capsule by mouth 3 times a day as needed for Cough.    VSS. Suspect viral etiology. Advised supportive care and to treat symptoms with medications, may use over-the-counter meds for symptoms as needed. Encouraged rest and adequate fluid intake.  Work note provided.    Differential diagnosis, natural history, supportive care, and indications for immediate follow-up discussed. Follow-up as needed with PCP or RTC for recheck, reevaluation, and consideration of further management. Recommend Emergency Department or call 911 if symptoms fail to improve, for any change in condition, further concerns, or new concerning symptoms.     Discussed management options (risks, benefits,  and alternatives to treatment). Pt/parent/guardian demonstrates understanding and the treatment plan was agreed upon.     Electronically signed by   Epi Brooks DNP, EUGENIA, KATIE-BC

## 2025-08-21 ENCOUNTER — APPOINTMENT (OUTPATIENT)
Dept: MEDICAL GROUP | Facility: PHYSICIAN GROUP | Age: 40
End: 2025-08-21
Payer: COMMERCIAL